# Patient Record
Sex: MALE | Race: WHITE | Employment: PART TIME | URBAN - METROPOLITAN AREA
[De-identification: names, ages, dates, MRNs, and addresses within clinical notes are randomized per-mention and may not be internally consistent; named-entity substitution may affect disease eponyms.]

---

## 2017-01-05 ENCOUNTER — TELEPHONE (OUTPATIENT)
Dept: FAMILY MEDICINE CLINIC | Facility: CLINIC | Age: 67
End: 2017-01-05

## 2017-01-05 DIAGNOSIS — E67.3 HYPERVITAMINOSIS D: ICD-10-CM

## 2017-01-05 DIAGNOSIS — Z86.39 H/O VITAMIN D DEFICIENCY: ICD-10-CM

## 2017-01-05 DIAGNOSIS — Z86.39 HX OF NON ANEMIC VITAMIN B12 DEFICIENCY: Primary | ICD-10-CM

## 2017-01-05 DIAGNOSIS — B18.2 HEPATITIS C CARRIER (HCC): ICD-10-CM

## 2017-01-05 DIAGNOSIS — I15.9 SECONDARY HYPERTENSION: ICD-10-CM

## 2017-01-05 DIAGNOSIS — Z12.5 PROSTATE CANCER SCREENING: ICD-10-CM

## 2017-01-05 DIAGNOSIS — E55.9 VITAMIN D DEFICIENCY: ICD-10-CM

## 2017-01-05 DIAGNOSIS — E78.5 HYPERLIPIDEMIA, UNSPECIFIED HYPERLIPIDEMIA TYPE: ICD-10-CM

## 2017-01-05 DIAGNOSIS — B18.2 CHRONIC HEPATITIS C WITHOUT HEPATIC COMA (HCC): Primary | ICD-10-CM

## 2017-01-05 NOTE — TELEPHONE ENCOUNTER
Please notify patient labs have been ordered, fast 8-10 hours prior to draw. Patient should complete 5-7 days prior to appt. Thank you!

## 2017-01-05 NOTE — TELEPHONE ENCOUNTER
Requesting labs prior to appt  LOV: 4/2016  RTC: 1 year   Last Labs: 5/2016    Future Appointments  Date Time Provider Jaron Dale   4/24/2017 4:30 PM Maryjo Childs MD [facility] [address] [facility] [address]       Okay to order routine labs at this time?  Hep C was negat

## 2017-01-05 NOTE — TELEPHONE ENCOUNTER
Patient said he has Chronic Hep C. And has not had a scan for a few years. He would like one ordered now so he can have prior to seeing you.   Future Appointments  Date Time Provider Jaron Dale   4/24/2017 4:30 PM Feli Haddad MD [facility] [address] [facility] [address]

## 2017-01-06 NOTE — TELEPHONE ENCOUNTER
Patient called and stated that Dr. Alyx Lomas ordered a kidney scan for him as well and wants to know if he should have the liver and kidney scan done together . He wants to know if this covers the same area.  He doesn't want to be charged extra through his insura

## 2017-01-06 NOTE — TELEPHONE ENCOUNTER
LM for patient that kidney u/s is different, okay to proceed with completing both. Can call back with any questions.

## 2017-01-12 NOTE — TELEPHONE ENCOUNTER
Spoke with Anahy Pringle and informed him that Dr. Nicolle Yuan ordered US specific to kidneys and Dr. Foster  ordered us complete abdomen which does cover the kidneys.    I advised patient to call Dr. Jorge Schroeder office and see what they recommend in regards to if patient should sti

## 2017-04-03 ENCOUNTER — LAB ENCOUNTER (OUTPATIENT)
Dept: LAB | Age: 67
End: 2017-04-03
Attending: INTERNAL MEDICINE
Payer: MEDICARE

## 2017-04-03 ENCOUNTER — HOSPITAL ENCOUNTER (OUTPATIENT)
Dept: ULTRASOUND IMAGING | Age: 67
Discharge: HOME | End: 2017-04-03
Attending: INTERNAL MEDICINE
Payer: MEDICARE

## 2017-04-03 ENCOUNTER — APPOINTMENT (OUTPATIENT)
Dept: ULTRASOUND IMAGING | Age: 67
End: 2017-04-03
Attending: UROLOGY
Payer: MEDICARE

## 2017-04-03 DIAGNOSIS — I15.9 SECONDARY HYPERTENSION: ICD-10-CM

## 2017-04-03 DIAGNOSIS — B18.2 CHRONIC HEPATITIS C WITHOUT HEPATIC COMA (HCC): ICD-10-CM

## 2017-04-03 DIAGNOSIS — Z12.5 PROSTATE CANCER SCREENING: ICD-10-CM

## 2017-04-03 DIAGNOSIS — E78.5 HYPERLIPIDEMIA, UNSPECIFIED HYPERLIPIDEMIA TYPE: ICD-10-CM

## 2017-04-03 DIAGNOSIS — E67.3 HYPERVITAMINOSIS D: ICD-10-CM

## 2017-04-03 DIAGNOSIS — B18.2 HEPATITIS C CARRIER (HCC): ICD-10-CM

## 2017-04-03 DIAGNOSIS — Z86.39 H/O VITAMIN D DEFICIENCY: ICD-10-CM

## 2017-04-03 DIAGNOSIS — Z86.39 HX OF NON ANEMIC VITAMIN B12 DEFICIENCY: ICD-10-CM

## 2017-04-03 DIAGNOSIS — E55.9 VITAMIN D DEFICIENCY: ICD-10-CM

## 2017-04-03 PROCEDURE — 80061 LIPID PANEL: CPT

## 2017-04-03 PROCEDURE — 76700 US EXAM ABDOM COMPLETE: CPT

## 2017-04-03 PROCEDURE — 82306 VITAMIN D 25 HYDROXY: CPT

## 2017-04-03 PROCEDURE — 36415 COLL VENOUS BLD VENIPUNCTURE: CPT

## 2017-04-03 PROCEDURE — 87522 HEPATITIS C REVRS TRNSCRPJ: CPT

## 2017-04-03 PROCEDURE — 82607 VITAMIN B-12: CPT

## 2017-04-03 PROCEDURE — 85025 COMPLETE CBC W/AUTO DIFF WBC: CPT

## 2017-04-03 PROCEDURE — 80053 COMPREHEN METABOLIC PANEL: CPT

## 2017-04-03 NOTE — PROGRESS NOTES
Quick Note:    He's got kidney cysts- he can fu with dr Jennifer Coon urologist  He has gallbladder thickening.  Liver normal  Can make a fu ov to discuss      ______

## 2017-04-04 NOTE — PROGRESS NOTES
Quick Note:    Labs here are normal, all are fine. Would not change patient's current regimen.  Please notify the patient.  ______

## 2017-04-24 ENCOUNTER — OFFICE VISIT (OUTPATIENT)
Dept: FAMILY MEDICINE CLINIC | Facility: CLINIC | Age: 67
End: 2017-04-24

## 2017-04-24 VITALS
BODY MASS INDEX: 26.98 KG/M2 | RESPIRATION RATE: 16 BRPM | HEART RATE: 60 BPM | WEIGHT: 178 LBS | HEIGHT: 68 IN | TEMPERATURE: 98 F | SYSTOLIC BLOOD PRESSURE: 118 MMHG | DIASTOLIC BLOOD PRESSURE: 76 MMHG

## 2017-04-24 DIAGNOSIS — E53.8 B12 DEFICIENCY: ICD-10-CM

## 2017-04-24 DIAGNOSIS — I72.3 ILIAC ARTERY ANEURYSM, BILATERAL (HCC): ICD-10-CM

## 2017-04-24 DIAGNOSIS — K82.4 POLYP OF GALLBLADDER: ICD-10-CM

## 2017-04-24 DIAGNOSIS — I48.20 CHRONIC ATRIAL FIBRILLATION (HCC): ICD-10-CM

## 2017-04-24 DIAGNOSIS — Z00.01 ENCOUNTER FOR GENERAL ADULT MEDICAL EXAMINATION WITH ABNORMAL FINDINGS: Primary | ICD-10-CM

## 2017-04-24 DIAGNOSIS — I10 ESSENTIAL HYPERTENSION: ICD-10-CM

## 2017-04-24 DIAGNOSIS — E78.2 MIXED HYPERLIPIDEMIA: ICD-10-CM

## 2017-04-24 DIAGNOSIS — N40.0 BENIGN NON-NODULAR PROSTATIC HYPERPLASIA WITHOUT LOWER URINARY TRACT SYMPTOMS: ICD-10-CM

## 2017-04-24 DIAGNOSIS — E55.9 VITAMIN D DEFICIENCY: ICD-10-CM

## 2017-04-24 PROCEDURE — G0439 PPPS, SUBSEQ VISIT: HCPCS | Performed by: INTERNAL MEDICINE

## 2017-04-24 RX ORDER — ZINC OXIDE 13 %
1 CREAM (GRAM) TOPICAL DAILY
COMMUNITY
End: 2017-05-01

## 2017-04-24 NOTE — PATIENT INSTRUCTIONS
Thank you for choosing Can Mcclure MD at [facility]  To Do: [address]  1. Gallbladder polyp- see dr Bobo Arroyo about it  2. Aneurysm of R thigh- see your cardiologist- show them test result to discuss  3.  Diet below    AVOID THESE FOODS TO Biscuits including chocolate chip biscuits   Bread, wheat – over 1 slice   Breadcrumbs   Cakes   Cereal bar, wheat based   Croissants   Crumpets   Egg noodles   Muffins   Pastries   Pasta, wheat over 1/2 cup cooked   Udon noodles   Wheat bran   Wheat cer Cheese, cream   Cheese, Halmoumi   Cheese, ricotta   Cream   Custard   Gelato   Ice cream   Kefir   Milk:   Cow milk   Goat milk   Evaporated milk   Sheep’s milk  Sour cream   Yoghurt – including greek yogurt    • Please signup for MY CHART, which is benjamin healthier and to improve your quality of life.     Referrals, and Radiology Information:    If your insurance requires a referral to a specialist, please allow 5 business days to process your referral request.    If Joyce Longo MD orders a CT or MRI, it ma

## 2017-04-24 NOTE — PROGRESS NOTES
Juwan Zayas is a 78year old male who presents for a Medicare Annual Wellness visit.     Health Maintenance Topics with due status: Overdue       Topic Date Due    Fall Risk Screening 04/20/2017    Annual Depression Screen 04/20/2017    Annual Physical 0 your current health state?: Good    How do you maintain positive mental well-being?: Social Interaction; Visiting Friends; Visiting Family    If you are a male age 38-65 or a female age 47-67, do you take aspirin?: Yes    Have you had any immunizations at an contraindicated based on patient's intolerance to aspirin. Diet assessment: good     Advanced Directive:  Healthcare Power of  on file in [address] [place] [address] Rd:   See above discussed with patient and provided information      Living Will on file in [facility]?    See a 13 DEBBIE IM    Update Immunization Activity if applicable    Hepatitis B No orders found for this or any previous visit. Update Immunization Activity if applicable    Tetanus No orders found for this or any previous visit.  Update Immunization Activity if yonathan INFORMATION:   Past Medical History   Diagnosis Date   • Acute hepatitis C without mention of hepatic coma 1/2006   • Sprain of lumbar region      Occassional low back pain   • Mitral valve disorders    • PUD (peptic ulcer disease) 11/1966   • Unspecified urgency, frequency and difficulty urinating. Musculoskeletal: Negative for arthralgias and no gait problem. Skin: Negative for color change and rash. Neurological: Negative for tremors, weakness and numbness. Hematological: Negative for adenopathy. Darrell Ulrich is a 78year old male who presents for a Medicare Assessment. PLAN SUMMARY:   Patient/Caregiver Education: Patient/Caregiver Education: There are no barriers to learning. Medical education done.  Outcome: Patient verbalizes understandin perfomed  Exercise counseling perfomed  5 year plan includes:  Fall Risk Screening due on 04/20/2017  Annual Depression Screen due on 04/20/2017  Annual Physical due on 04/20/2017  Adult Pneumonia Vaccine(2 of 2 - PPSV23) due on 04/20/2017  Influenza Vacci

## 2017-06-01 ENCOUNTER — MED REC SCAN ONLY (OUTPATIENT)
Dept: FAMILY MEDICINE CLINIC | Facility: CLINIC | Age: 67
End: 2017-06-01

## 2017-08-25 ENCOUNTER — OFFICE VISIT (OUTPATIENT)
Dept: FAMILY MEDICINE CLINIC | Facility: CLINIC | Age: 67
End: 2017-08-25

## 2017-08-25 VITALS
RESPIRATION RATE: 16 BRPM | HEART RATE: 72 BPM | BODY MASS INDEX: 27.28 KG/M2 | WEIGHT: 180 LBS | DIASTOLIC BLOOD PRESSURE: 80 MMHG | TEMPERATURE: 97 F | HEIGHT: 68 IN | SYSTOLIC BLOOD PRESSURE: 124 MMHG

## 2017-08-25 DIAGNOSIS — S16.1XXA STRAIN OF NECK MUSCLE, INITIAL ENCOUNTER: Primary | ICD-10-CM

## 2017-08-25 PROCEDURE — 99213 OFFICE O/P EST LOW 20 MIN: CPT | Performed by: NURSE PRACTITIONER

## 2017-08-25 RX ORDER — DICLOFENAC SODIUM 75 MG/1
75 TABLET, DELAYED RELEASE ORAL 2 TIMES DAILY
Qty: 60 TABLET | Refills: 0 | Status: SHIPPED | OUTPATIENT
Start: 2017-08-25 | End: 2018-04-24

## 2017-08-25 NOTE — PATIENT INSTRUCTIONS
Thank you for choosing AD[facility] Russell at [place] [address]  To Do: [address]  1. Schedule appt for physical therapy   2.  Can take anti-inflamm. (diclofeanc) as needed for pain (up to 2 times per day)  -massage, heat/ice, icy hot lotion or pa risks of treatment even beyond those discussed today.  All therapies have potential risk of harm or side effects or medication interactions.  It is your duty and for your safety to discuss with the pharmacist and our office with questions, and to notify us

## 2017-08-25 NOTE — PROGRESS NOTES
[facility] Internal Medicine Office Note  Chief Complaint:   Patient presents with:  Neck Pain: started in June, pain is located on the left side     HPI:   This is a 78year old male coming in for  HPI  C/o of left side of neck pain 2.5 months Weight as of this encounter: 180 lb. Vital signs reviewed. Appears stated age, well groomed. Physical Exam   Vitals reviewed. Constitutional: He is oriented to person, place, and time.  Vital signs are normal. He appears well-developed and well-nou Adult Pneumonia Vaccine(2 of 2 - PPSV23) due on 04/20/2017  Patient/Caregiver Education: There are no barriers to learning. Medical education done. Outcome: Patient verbalizes understanding.  Patient is notified to call with any questions, complications, al

## 2017-08-30 ENCOUNTER — OFFICE VISIT (OUTPATIENT)
Dept: PHYSICAL THERAPY | Age: 67
End: 2017-08-30
Attending: INTERNAL MEDICINE
Payer: MEDICARE

## 2017-08-30 DIAGNOSIS — S16.1XXA STRAIN OF NECK MUSCLE, INITIAL ENCOUNTER: ICD-10-CM

## 2017-08-30 PROCEDURE — 97161 PT EVAL LOW COMPLEX 20 MIN: CPT

## 2017-08-30 PROCEDURE — 97530 THERAPEUTIC ACTIVITIES: CPT

## 2017-08-30 NOTE — PROGRESS NOTES
SPINE EVALUATION:   Referring Physician: Ms. Darius Munroe  Diagnosis: Cervicogenic Headaches     Date of Service: 8/30/2017     PATIENT Anthony Ro is a 78year old y/o male who presents to therapy today with complaints of headaches that initiated Moderate restrictions of the middle and lower trapezius (3+/5). Flexibility: Moderate restrictions of the pec major/minor and the upper trapezius. Special tests: Decreased deep neck flexors MMT.       Today’s Treatment and Response:  Patient Ana Laura Meghan

## 2017-08-31 ENCOUNTER — TELEPHONE (OUTPATIENT)
Dept: FAMILY MEDICINE CLINIC | Facility: CLINIC | Age: 67
End: 2017-08-31

## 2017-08-31 NOTE — TELEPHONE ENCOUNTER
Patient wanted to know if he should take the diclofenac twice a day or as needed twice a day. Informed patient to take as needed twice a day. Patient voiced understanding.

## 2017-08-31 NOTE — TELEPHONE ENCOUNTER
Pt would like to clarify directions, sts when he picked up from pharmacy he was told differently then what is on his after visit summary. Diclofenac Sodium 75 MG Oral Tab EC 60 tablet 0 8/25/2017     Sig - Route:  Take 1 tablet (75 mg total) by mouth 2 (

## 2017-09-06 ENCOUNTER — OFFICE VISIT (OUTPATIENT)
Dept: PHYSICAL THERAPY | Age: 67
End: 2017-09-06
Attending: INTERNAL MEDICINE
Payer: MEDICARE

## 2017-09-06 PROCEDURE — 97110 THERAPEUTIC EXERCISES: CPT

## 2017-09-06 PROCEDURE — 97140 MANUAL THERAPY 1/> REGIONS: CPT

## 2017-09-06 NOTE — PROGRESS NOTES
Dx: Cervical OA         Authorized # of Visits:  12         Next MD visit: none scheduled  Fall Risk: standard         Precautions: n/a             Subjective: The patient states that the exercises are doing him well to this point.   Had some pain with an a

## 2017-09-11 ENCOUNTER — OFFICE VISIT (OUTPATIENT)
Dept: PHYSICAL THERAPY | Age: 67
End: 2017-09-11
Attending: INTERNAL MEDICINE
Payer: MEDICARE

## 2017-09-11 PROCEDURE — 97140 MANUAL THERAPY 1/> REGIONS: CPT

## 2017-09-11 PROCEDURE — 97110 THERAPEUTIC EXERCISES: CPT

## 2017-09-13 ENCOUNTER — OFFICE VISIT (OUTPATIENT)
Dept: PHYSICAL THERAPY | Age: 67
End: 2017-09-13
Attending: INTERNAL MEDICINE
Payer: MEDICARE

## 2017-09-13 PROCEDURE — 97110 THERAPEUTIC EXERCISES: CPT

## 2017-09-13 PROCEDURE — 97140 MANUAL THERAPY 1/> REGIONS: CPT

## 2017-09-13 NOTE — PROGRESS NOTES
Dx: Cervical OA         Authorized # of Visits:  12         Next MD visit: none scheduled  Fall Risk: standard         Precautions: n/a             Subjective: Neck ROM is increasing with less pain and increased postural awareness.   Had 2 day being painfre cervicothoracic region without increased pain. Upper thoracic spine remains moderately restricted, despite the patient making improvements with manipulation. Goals:   1. Increase FOTO >11% from INE.     2. Increase pec flexibility and posterior scap

## 2017-09-18 ENCOUNTER — OFFICE VISIT (OUTPATIENT)
Dept: PHYSICAL THERAPY | Age: 67
End: 2017-09-18
Attending: INTERNAL MEDICINE
Payer: MEDICARE

## 2017-09-18 PROCEDURE — 97110 THERAPEUTIC EXERCISES: CPT

## 2017-09-18 PROCEDURE — 97140 MANUAL THERAPY 1/> REGIONS: CPT

## 2017-09-18 NOTE — PROGRESS NOTES
Dx: Cervical OA         Authorized # of Visits:  12         Next MD visit: none scheduled  Fall Risk: standard         Precautions: n/a             Subjective: Feeling good. No new complaints. Objective: Treatment  proressed per treatment log.   Cerv Doorway nods x 10        Push up position Serratus Punch Push up position Serratus Punch        Push up position Unilateral trunk rotation. Push up position Unilateral trunk rotation. Skilled Services:  All exercises and manual intervention provi

## 2017-09-20 ENCOUNTER — OFFICE VISIT (OUTPATIENT)
Dept: PHYSICAL THERAPY | Age: 67
End: 2017-09-20
Attending: INTERNAL MEDICINE
Payer: MEDICARE

## 2017-09-20 PROCEDURE — 97140 MANUAL THERAPY 1/> REGIONS: CPT

## 2017-09-20 PROCEDURE — 97110 THERAPEUTIC EXERCISES: CPT

## 2017-09-20 NOTE — PROGRESS NOTES
Dx: Cervical OA         Authorized # of Visits:  12         Next MD visit: none scheduled  Fall Risk: standard         Precautions: n/a             Subjective: States he had mild mid thoracic pain after exercise last session.   States that the neck has impr posterior nods in doorway x 10 OA posterior nods in doorway x 10 OA posterior nods in doorway x 10 OA posterior nods in doorway x 10     Wall washes x 20 Wall washes x 20 Wall washes x 20 Wall washes x 20 Wall washes x 20       CC: Unilateral Rows with sca

## 2017-09-25 ENCOUNTER — OFFICE VISIT (OUTPATIENT)
Dept: PHYSICAL THERAPY | Age: 67
End: 2017-09-25
Attending: INTERNAL MEDICINE
Payer: MEDICARE

## 2017-09-25 PROCEDURE — 97110 THERAPEUTIC EXERCISES: CPT

## 2017-09-25 PROCEDURE — 97140 MANUAL THERAPY 1/> REGIONS: CPT

## 2017-09-25 NOTE — PROGRESS NOTES
Dx: Cervical OA         Authorized # of Visits:  12         Next MD visit: none scheduled  Fall Risk: standard         Precautions: n/a             Subjective: Feeling good. No complaints at this time.  States he had some soreness after PT last session, wh 30 sec x 3 pec stretch 30 sec x 3 pec stretch 30 sec x 3 pec stretch 30 sec x 3 pec stretch 30 sec x 3 pec stretch 30 sec x 3    OA posterior nods in doorway x 10 OA posterior nods in doorway x 10 OA posterior nods in doorway x 10 OA posterior nods in door

## 2017-09-27 ENCOUNTER — OFFICE VISIT (OUTPATIENT)
Dept: PHYSICAL THERAPY | Age: 67
End: 2017-09-27
Attending: INTERNAL MEDICINE
Payer: MEDICARE

## 2017-09-27 PROCEDURE — 97140 MANUAL THERAPY 1/> REGIONS: CPT

## 2017-09-27 PROCEDURE — 97110 THERAPEUTIC EXERCISES: CPT

## 2017-09-27 NOTE — PROGRESS NOTES
Dx: Cervical OA         Authorized # of Visits:  12         Next MD visit: none scheduled  Fall Risk: standard         Precautions: n/a             Subjective: Feeling good. No new complaints. Objective: Treatment  proressed per treatment log.   Coby Tello x 20 red \"w\" exercises x 20 red \"w\" exercises x 20 red \"w\" exercises x 20 red \"w\" exercises x 20 red   pec stretch 30 sec x 3 pec stretch 30 sec x 3 pec stretch 30 sec x 3 pec stretch 30 sec x 3 pec stretch 30 sec x 3 pec stretch 30 sec x 3 pec str posturing (MET). 3. Increase OA and AA motion to full active and passive motion without increase in headache type symptoms (MET). 4. Increase postural awareness to include sternal lift and posterior OA rotation (MET). Plan: DC PT.        Charge

## 2018-04-12 ENCOUNTER — TELEPHONE (OUTPATIENT)
Dept: FAMILY MEDICINE CLINIC | Facility: CLINIC | Age: 68
End: 2018-04-12

## 2018-04-12 ENCOUNTER — HOSPITAL ENCOUNTER (OUTPATIENT)
Dept: ULTRASOUND IMAGING | Age: 68
Discharge: HOME | End: 2018-04-12
Attending: FAMILY MEDICINE
Payer: MEDICARE

## 2018-04-12 ENCOUNTER — LAB ENCOUNTER (OUTPATIENT)
Dept: LAB | Age: 68
End: 2018-04-12
Attending: FAMILY MEDICINE
Payer: MEDICARE

## 2018-04-12 ENCOUNTER — HOSPITAL ENCOUNTER (OUTPATIENT)
Dept: ULTRASOUND IMAGING | Age: 68
Discharge: HOME | End: 2018-04-12
Attending: UROLOGY
Payer: MEDICARE

## 2018-04-12 DIAGNOSIS — E53.8 B12 DEFICIENCY: ICD-10-CM

## 2018-04-12 DIAGNOSIS — E78.2 MIXED HYPERLIPIDEMIA: ICD-10-CM

## 2018-04-12 DIAGNOSIS — D17.71 RENAL ANGIOMYOLIPOMA: ICD-10-CM

## 2018-04-12 DIAGNOSIS — I72.3 ILIAC ARTERY ANEURYSM, BILATERAL (HCC): ICD-10-CM

## 2018-04-12 DIAGNOSIS — N40.0 BENIGN NON-NODULAR PROSTATIC HYPERPLASIA WITHOUT LOWER URINARY TRACT SYMPTOMS: ICD-10-CM

## 2018-04-12 DIAGNOSIS — I10 ESSENTIAL HYPERTENSION: ICD-10-CM

## 2018-04-12 DIAGNOSIS — E55.9 VITAMIN D DEFICIENCY: ICD-10-CM

## 2018-04-12 DIAGNOSIS — B18.2 CHRONIC HEPATITIS C WITHOUT HEPATIC COMA (HCC): ICD-10-CM

## 2018-04-12 DIAGNOSIS — B18.2 CHRONIC HEPATITIS C WITHOUT HEPATIC COMA (HCC): Primary | ICD-10-CM

## 2018-04-12 PROCEDURE — 76700 US EXAM ABDOM COMPLETE: CPT | Performed by: FAMILY MEDICINE

## 2018-04-12 PROCEDURE — 85025 COMPLETE CBC W/AUTO DIFF WBC: CPT

## 2018-04-12 PROCEDURE — 81003 URINALYSIS AUTO W/O SCOPE: CPT

## 2018-04-12 PROCEDURE — 82306 VITAMIN D 25 HYDROXY: CPT

## 2018-04-12 PROCEDURE — 80053 COMPREHEN METABOLIC PANEL: CPT

## 2018-04-12 PROCEDURE — 82607 VITAMIN B-12: CPT

## 2018-04-12 PROCEDURE — 36415 COLL VENOUS BLD VENIPUNCTURE: CPT

## 2018-04-12 PROCEDURE — 80061 LIPID PANEL: CPT

## 2018-04-12 NOTE — TELEPHONE ENCOUNTER
Former patient of Dr. Janet Anne who has history of abd. Aortic aneurysm. He is to have it checked yearly and Dr. Autumn Pandey was to put in order. He is at outpatient now for u/s of kidneys and wants his abdominal u/s done today.   He is scheduled to f/u with Dr. Simin Shaw

## 2018-04-16 ENCOUNTER — TELEPHONE (OUTPATIENT)
Dept: FAMILY MEDICINE CLINIC | Facility: CLINIC | Age: 68
End: 2018-04-16

## 2018-04-24 ENCOUNTER — TELEPHONE (OUTPATIENT)
Dept: FAMILY MEDICINE CLINIC | Facility: CLINIC | Age: 68
End: 2018-04-24

## 2018-04-24 ENCOUNTER — OFFICE VISIT (OUTPATIENT)
Dept: FAMILY MEDICINE CLINIC | Facility: CLINIC | Age: 68
End: 2018-04-24

## 2018-04-24 VITALS
BODY MASS INDEX: 26.98 KG/M2 | HEIGHT: 68 IN | TEMPERATURE: 98 F | RESPIRATION RATE: 16 BRPM | DIASTOLIC BLOOD PRESSURE: 80 MMHG | HEART RATE: 62 BPM | OXYGEN SATURATION: 98 % | WEIGHT: 178 LBS | SYSTOLIC BLOOD PRESSURE: 110 MMHG

## 2018-04-24 DIAGNOSIS — Z00.00 ENCOUNTER FOR MEDICARE ANNUAL WELLNESS EXAM: Primary | ICD-10-CM

## 2018-04-24 DIAGNOSIS — B18.2 CHRONIC HEPATITIS C WITHOUT HEPATIC COMA (HCC): ICD-10-CM

## 2018-04-24 PROCEDURE — G0438 PPPS, INITIAL VISIT: HCPCS | Performed by: FAMILY MEDICINE

## 2018-04-24 RX ORDER — WARFARIN SODIUM 2 MG/1
TABLET ORAL
COMMUNITY
Start: 2018-04-06 | End: 2021-07-19 | Stop reason: ALTCHOICE

## 2018-04-24 NOTE — PATIENT INSTRUCTIONS
Thank you for choosing Fredy Matute MD at [place] [address]  To Do: [address]  1. Please see age appropriate health prevention below    [employer] is located in [address]. Monday, Tuesday & Friday – 8 a.m. to 4 p.m.   Wednesday, Thursd • Please call our office about any questions regarding your treatment/medicines/tests as a result of today's visit.  For your safety, read the entire package insert of all medicines prescribed to you and be aware of all of the risks of treatment even beyon Screening tests and vaccines are an important part of managing your health. Health counseling is essential, too. Below are guidelines for these, for men ages 72 and older. Talk with your healthcare provider to make sure you’re up-to-date on what you need. Prostate cancer All men in this age group, talk to healthcare provider about risks and benefits of digital rectal exam (OMAR) and prostate-specific antigen (PSA) screening1 At routine exams   Syphilis Men at increased risk for infection – talk with your hea Fall prevention (exercise, vitamin D supplements) All men in this age group At routine exams   Sexually transmitted infection Men at increased risk for infection – talk with your healthcare provider At routine exams   Use of daily aspirin Men ages 39 to 78

## 2018-04-24 NOTE — TELEPHONE ENCOUNTER
Per today's OV, here is the fax # to the cardiologist as discussed in [address] Daniel Notice Dr. Lupe Forman

## 2018-04-24 NOTE — TELEPHONE ENCOUNTER
Recent labs and ultrasound were faxed to Dr Irasema Sequeira @ [phone]. # [phone].   [facility] [address]  [address] [address]

## 2018-04-24 NOTE — H&P
Wellness Exam    REASON FOR VISIT:    Fernando Connelly is a 80year old male who presents for an [address]     Current Complaints: Mr. Alan Delgado is a pleasant 79 y/o M here for his wellness  Flu Shot: see immunization record  Health Maintenance T No results found for: FOB, OCCULTSTOOL    Obesity Screening Screen all adults annually Body mass index is 27.06 kg/m².       Preventive Services for Which Recommendations Vary with Risk Recommendation Internal Lab or Procedure External Lab or Procedure   Ch aneurysm, bilateral ([facility] [address] 5/9/2016   • Mitral valve disorders(424.0)    • Mitral valve insufficiency 1/9/2014    severe    • Pacemaker 7/28/2015   • PUD (peptic ulcer disease) 11/1966   • Renal cyst 5/9/2016   • S/P mitral valve repair 8/4/2014 2005 rin problem. Skin: Negative for color change and rash. Neurological: Negative for tremors, weakness and numbness. Hematological: Negative for adenopathy. Does not bruise/bleed easily. Psychiatric/Behavioral: Negative for confusion and agitation.  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

## 2018-06-29 ENCOUNTER — APPOINTMENT (OUTPATIENT)
Dept: LAB | Age: 68
End: 2018-06-29
Attending: FAMILY MEDICINE
Payer: MEDICARE

## 2018-06-29 DIAGNOSIS — E55.9 VITAMIN D DEFICIENCY: ICD-10-CM

## 2018-06-29 DIAGNOSIS — B18.2 CHRONIC HEPATITIS C WITHOUT HEPATIC COMA (HCC): ICD-10-CM

## 2018-06-29 LAB — 25-HYDROXYVITAMIN D (TOTAL): 31.4 NG/ML (ref 30–100)

## 2018-06-29 PROCEDURE — 87522 HEPATITIS C REVRS TRNSCRPJ: CPT

## 2018-06-29 PROCEDURE — 82306 VITAMIN D 25 HYDROXY: CPT

## 2018-06-29 PROCEDURE — 36415 COLL VENOUS BLD VENIPUNCTURE: CPT

## 2018-07-01 ENCOUNTER — MED REC SCAN ONLY (OUTPATIENT)
Dept: FAMILY MEDICINE CLINIC | Facility: CLINIC | Age: 68
End: 2018-07-01

## 2018-07-05 ENCOUNTER — TELEPHONE (OUTPATIENT)
Dept: FAMILY MEDICINE CLINIC | Facility: CLINIC | Age: 68
End: 2018-07-05

## 2018-07-05 LAB — HCV RNA SERPL QL NAA+PROBE: NOT DETECTED

## 2018-07-05 NOTE — TELEPHONE ENCOUNTER
Patient called for results from bloodwork done on 06-28-18. Melissa Fraser it is ok to call tomorrow.

## 2019-02-09 ENCOUNTER — HOSPITAL ENCOUNTER (EMERGENCY)
Age: 69
Discharge: HOME | End: 2019-02-09
Attending: EMERGENCY MEDICINE
Payer: MEDICARE

## 2019-02-09 VITALS
OXYGEN SATURATION: 96 % | RESPIRATION RATE: 16 BRPM | HEIGHT: 69 IN | TEMPERATURE: 99 F | DIASTOLIC BLOOD PRESSURE: 80 MMHG | SYSTOLIC BLOOD PRESSURE: 142 MMHG | BODY MASS INDEX: 26.66 KG/M2 | HEART RATE: 60 BPM | WEIGHT: 180 LBS

## 2019-02-09 DIAGNOSIS — R11.2 NAUSEA AND VOMITING IN ADULT: Primary | ICD-10-CM

## 2019-02-09 LAB
ALBUMIN SERPL-MCNC: 3.5 G/DL (ref 3.1–4.5)
ALBUMIN/GLOB SERPL: 0.8 {RATIO} (ref 1–2)
ALP LIVER SERPL-CCNC: 53 U/L (ref 45–117)
ALT SERPL-CCNC: 21 U/L (ref 17–63)
ANION GAP SERPL CALC-SCNC: 6 MMOL/L (ref 0–18)
APTT PPP: 26 SECONDS (ref 26.1–34.6)
AST SERPL-CCNC: 25 U/L (ref 15–41)
BASOPHILS # BLD AUTO: 0.02 X10(3) UL (ref 0–0.2)
BASOPHILS NFR BLD AUTO: 0.2 %
BILIRUB SERPL-MCNC: 1.3 MG/DL (ref 0.1–2)
BUN BLD-MCNC: 21 MG/DL (ref 8–20)
BUN/CREAT SERPL: 23.1 (ref 10–20)
CALCIUM BLD-MCNC: 8.6 MG/DL (ref 8.3–10.3)
CHLORIDE SERPL-SCNC: 102 MMOL/L (ref 101–111)
CO2 SERPL-SCNC: 29 MMOL/L (ref 22–32)
COLOR UR AUTO: YELLOW
CREAT BLD-MCNC: 0.91 MG/DL (ref 0.7–1.3)
DEPRECATED RDW RBC AUTO: 45.3 FL (ref 35.1–46.3)
EOSINOPHIL # BLD AUTO: 0.01 X10(3) UL (ref 0–0.7)
EOSINOPHIL NFR BLD AUTO: 0.1 %
ERYTHROCYTE [DISTWIDTH] IN BLOOD BY AUTOMATED COUNT: 13 % (ref 11–15)
GLOBULIN PLAS-MCNC: 4.2 G/DL (ref 2.8–4.4)
GLUCOSE BLD-MCNC: 113 MG/DL (ref 70–99)
GLUCOSE UR STRIP.AUTO-MCNC: NEGATIVE MG/DL
HCT VFR BLD AUTO: 42.2 % (ref 39–53)
HGB BLD-MCNC: 13.3 G/DL (ref 13–17.5)
IMM GRANULOCYTES # BLD AUTO: 0.03 X10(3) UL (ref 0–1)
IMM GRANULOCYTES NFR BLD: 0.3 %
INR BLD: 2.57 (ref 0.92–1.08)
KETONES UR STRIP.AUTO-MCNC: 15 MG/DL
LEUKOCYTE ESTERASE UR QL STRIP.AUTO: NEGATIVE
LIPASE: 70 U/L (ref 73–393)
LYMPHOCYTES # BLD AUTO: 0.72 X10(3) UL (ref 1–4)
LYMPHOCYTES NFR BLD AUTO: 6.9 %
M PROTEIN MFR SERPL ELPH: 7.7 G/DL (ref 6.4–8.2)
MCH RBC QN AUTO: 29.8 PG (ref 26–34)
MCHC RBC AUTO-ENTMCNC: 31.5 G/DL (ref 31–37)
MCV RBC AUTO: 94.6 FL (ref 80–100)
MONOCYTES # BLD AUTO: 1.04 X10(3) UL (ref 0.1–1)
MONOCYTES NFR BLD AUTO: 9.9 %
NEUTROPHILS # BLD AUTO: 8.68 X10 (3) UL (ref 1.5–7.7)
NEUTROPHILS # BLD AUTO: 8.68 X10(3) UL (ref 1.5–7.7)
NEUTROPHILS NFR BLD AUTO: 82.6 %
NITRITE UR QL STRIP.AUTO: NEGATIVE
OSMOLALITY SERPL CALC.SUM OF ELEC: 288 MOSM/KG (ref 275–295)
PH UR STRIP.AUTO: 6 [PH] (ref 4.5–8)
PLATELET # BLD AUTO: 197 10(3)UL (ref 150–450)
POTASSIUM SERPL-SCNC: 4.2 MMOL/L (ref 3.6–5.1)
PSA SERPL DL<=0.01 NG/ML-MCNC: 27.8 SECONDS (ref 12.5–14.1)
RBC # BLD AUTO: 4.46 X10(6)UL (ref 3.8–5.8)
SODIUM SERPL-SCNC: 137 MMOL/L (ref 136–144)
SP GR UR STRIP.AUTO: 1.02 (ref 1–1.03)
TROPONIN I SERPL-MCNC: <0.046 NG/ML (ref ?–0.05)
UROBILINOGEN UR STRIP.AUTO-MCNC: 1 MG/DL
WBC # BLD AUTO: 10.5 X10(3) UL (ref 4–11)

## 2019-02-09 PROCEDURE — 93005 ELECTROCARDIOGRAM TRACING: CPT

## 2019-02-09 PROCEDURE — 85025 COMPLETE CBC W/AUTO DIFF WBC: CPT | Performed by: EMERGENCY MEDICINE

## 2019-02-09 PROCEDURE — 99285 EMERGENCY DEPT VISIT HI MDM: CPT

## 2019-02-09 PROCEDURE — 96360 HYDRATION IV INFUSION INIT: CPT

## 2019-02-09 PROCEDURE — 85730 THROMBOPLASTIN TIME PARTIAL: CPT | Performed by: EMERGENCY MEDICINE

## 2019-02-09 PROCEDURE — 85610 PROTHROMBIN TIME: CPT | Performed by: EMERGENCY MEDICINE

## 2019-02-09 PROCEDURE — 80053 COMPREHEN METABOLIC PANEL: CPT | Performed by: EMERGENCY MEDICINE

## 2019-02-09 PROCEDURE — 84484 ASSAY OF TROPONIN QUANT: CPT | Performed by: EMERGENCY MEDICINE

## 2019-02-09 PROCEDURE — 96361 HYDRATE IV INFUSION ADD-ON: CPT

## 2019-02-09 PROCEDURE — 83690 ASSAY OF LIPASE: CPT | Performed by: EMERGENCY MEDICINE

## 2019-02-09 PROCEDURE — 81001 URINALYSIS AUTO W/SCOPE: CPT | Performed by: EMERGENCY MEDICINE

## 2019-02-09 PROCEDURE — 99284 EMERGENCY DEPT VISIT MOD MDM: CPT

## 2019-02-09 PROCEDURE — 93010 ELECTROCARDIOGRAM REPORT: CPT

## 2019-02-09 RX ORDER — SODIUM CHLORIDE 9 MG/ML
125 INJECTION, SOLUTION INTRAVENOUS CONTINUOUS
Status: DISCONTINUED | OUTPATIENT
Start: 2019-02-09 | End: 2019-02-09

## 2019-02-09 RX ORDER — ONDANSETRON 4 MG/1
4 TABLET, ORALLY DISINTEGRATING ORAL EVERY 6 HOURS PRN
Qty: 10 TABLET | Refills: 0 | Status: SHIPPED | OUTPATIENT
Start: 2019-02-09 | End: 2019-02-16

## 2019-02-09 RX ORDER — ONDANSETRON 2 MG/ML
4 INJECTION INTRAMUSCULAR; INTRAVENOUS ONCE
Status: DISCONTINUED | OUTPATIENT
Start: 2019-02-09 | End: 2019-02-09

## 2019-02-09 NOTE — ED INITIAL ASSESSMENT (HPI)
Pt states Thursday night vomited, once again Friday am and last night ,states thurs night had epigastric pain  Which resolved,.  Denies nausea now

## 2019-02-09 NOTE — ED PROVIDER NOTES
Patient Seen in: THE [facility] Emergency Department In [place]    History   Patient presents with:  Nausea/Vomiting/Diarrhea (gastrointestinal)    Stated Complaint: vomiting since Wednesday    HPI    Patient is a 50-year-old male with a history of atrial fib when he was a teenager.    • OTHER SURGICAL HISTORY  7/14/2005    mitral valve repair by Dr. Jose Manuel Ramirez   • REPLACEMENT OF MITRAL VALVE  6.2015    bioprosthesis bioprost mitral valve 6.2015- promise- have junctional and afib after pacer placed- rec Value    Glucose 113 (*)     BUN 21 (*)     BUN/CREA Ratio 23.1 (*)     A/G Ratio 0.8 (*)     All other components within normal limits   PROTHROMBIN TIME (PT) - Abnormal; Notable for the following components:    PT 27.8 (*)     INR 2.57 (*)     All other rhythm at 60. Incomplete right bundle branch block with RVH. No ST segment or T wave changes. MDM   50-year-old male presenting with intermittent nausea and vomiting for the last 2 days.   He had some epigastric pain on the day of onset b

## 2019-02-11 LAB
ATRIAL RATE: 60 BPM
P-R INTERVAL: 282 MS
Q-T INTERVAL: 416 MS
QRS DURATION: 108 MS
QTC CALCULATION (BEZET): 416 MS
R AXIS: 244 DEGREES
T AXIS: 17 DEGREES
VENTRICULAR RATE: 60 BPM

## 2019-03-19 ENCOUNTER — TELEPHONE (OUTPATIENT)
Dept: FAMILY MEDICINE CLINIC | Facility: CLINIC | Age: 69
End: 2019-03-19

## 2019-03-19 DIAGNOSIS — I10 HYPERTENSION, UNSPECIFIED TYPE: ICD-10-CM

## 2019-03-19 DIAGNOSIS — Z12.5 SCREENING FOR MALIGNANT NEOPLASM OF PROSTATE: ICD-10-CM

## 2019-03-19 DIAGNOSIS — E55.9 VITAMIN D DEFICIENCY: ICD-10-CM

## 2019-03-19 DIAGNOSIS — E53.8 VITAMIN B12 DEFICIENCY: ICD-10-CM

## 2019-03-19 DIAGNOSIS — Z00.00 ROUTINE GENERAL MEDICAL EXAMINATION AT A HEALTH CARE FACILITY: ICD-10-CM

## 2019-03-19 DIAGNOSIS — B18.2 CHRONIC HEPATITIS C WITHOUT HEPATIC COMA (HCC): ICD-10-CM

## 2019-03-19 DIAGNOSIS — B18.2 HEPATITIS C CARRIER (HCC): ICD-10-CM

## 2019-03-19 DIAGNOSIS — E78.5 HYPERLIPIDEMIA, UNSPECIFIED HYPERLIPIDEMIA TYPE: ICD-10-CM

## 2019-03-19 DIAGNOSIS — I72.3 ILIAC ARTERY ANEURYSM, BILATERAL (HCC): Primary | ICD-10-CM

## 2019-03-19 NOTE — TELEPHONE ENCOUNTER
Pt has an upcoming appt and needs an order for US of the abd for aneurysm and needs his yearly US. Also needs labs placed for hepatitis and what ever else the physician wants.     Future Appointments   Date Time Provider Jaron Dale   4/24/2019  8:00

## 2019-04-15 ENCOUNTER — HOSPITAL ENCOUNTER (OUTPATIENT)
Dept: ULTRASOUND IMAGING | Age: 69
Discharge: HOME | End: 2019-04-15
Attending: FAMILY MEDICINE
Payer: MEDICARE

## 2019-04-15 ENCOUNTER — LAB ENCOUNTER (OUTPATIENT)
Dept: LAB | Age: 69
End: 2019-04-15
Attending: FAMILY MEDICINE
Payer: MEDICARE

## 2019-04-15 DIAGNOSIS — I72.3 ILIAC ARTERY ANEURYSM, BILATERAL (HCC): ICD-10-CM

## 2019-04-15 DIAGNOSIS — E78.5 HYPERLIPIDEMIA, UNSPECIFIED HYPERLIPIDEMIA TYPE: ICD-10-CM

## 2019-04-15 DIAGNOSIS — I10 HYPERTENSION, UNSPECIFIED TYPE: ICD-10-CM

## 2019-04-15 DIAGNOSIS — B18.2 CHRONIC HEPATITIS C WITHOUT HEPATIC COMA (HCC): ICD-10-CM

## 2019-04-15 DIAGNOSIS — B18.2 HEPATITIS C CARRIER (HCC): ICD-10-CM

## 2019-04-15 DIAGNOSIS — E53.8 VITAMIN B12 DEFICIENCY: ICD-10-CM

## 2019-04-15 DIAGNOSIS — Z12.5 SCREENING FOR MALIGNANT NEOPLASM OF PROSTATE: ICD-10-CM

## 2019-04-15 DIAGNOSIS — E55.9 VITAMIN D DEFICIENCY: ICD-10-CM

## 2019-04-15 DIAGNOSIS — Z00.00 ROUTINE GENERAL MEDICAL EXAMINATION AT A HEALTH CARE FACILITY: ICD-10-CM

## 2019-04-15 PROCEDURE — 85025 COMPLETE CBC W/AUTO DIFF WBC: CPT

## 2019-04-15 PROCEDURE — 82607 VITAMIN B-12: CPT

## 2019-04-15 PROCEDURE — 76700 US EXAM ABDOM COMPLETE: CPT | Performed by: FAMILY MEDICINE

## 2019-04-15 PROCEDURE — 87522 HEPATITIS C REVRS TRNSCRPJ: CPT

## 2019-04-15 PROCEDURE — 80053 COMPREHEN METABOLIC PANEL: CPT

## 2019-04-15 PROCEDURE — 36415 COLL VENOUS BLD VENIPUNCTURE: CPT

## 2019-04-15 PROCEDURE — 84443 ASSAY THYROID STIM HORMONE: CPT

## 2019-04-15 PROCEDURE — 80061 LIPID PANEL: CPT

## 2019-04-15 PROCEDURE — 82306 VITAMIN D 25 HYDROXY: CPT

## 2019-04-24 ENCOUNTER — OFFICE VISIT (OUTPATIENT)
Dept: FAMILY MEDICINE CLINIC | Facility: CLINIC | Age: 69
End: 2019-04-24
Payer: MEDICARE

## 2019-04-24 VITALS
SYSTOLIC BLOOD PRESSURE: 112 MMHG | DIASTOLIC BLOOD PRESSURE: 70 MMHG | HEART RATE: 68 BPM | RESPIRATION RATE: 16 BRPM | TEMPERATURE: 99 F | BODY MASS INDEX: 26.83 KG/M2 | HEIGHT: 68 IN | WEIGHT: 177 LBS

## 2019-04-24 DIAGNOSIS — Z13.0 SCREENING FOR ENDOCRINE, METABOLIC AND IMMUNITY DISORDER: ICD-10-CM

## 2019-04-24 DIAGNOSIS — B18.2 CHRONIC HEPATITIS C WITHOUT HEPATIC COMA (HCC): ICD-10-CM

## 2019-04-24 DIAGNOSIS — Z00.00 ENCOUNTER FOR MEDICARE ANNUAL WELLNESS EXAM: Primary | ICD-10-CM

## 2019-04-24 DIAGNOSIS — E55.9 VITAMIN D DEFICIENCY: ICD-10-CM

## 2019-04-24 DIAGNOSIS — Z13.228 SCREENING FOR ENDOCRINE, METABOLIC AND IMMUNITY DISORDER: ICD-10-CM

## 2019-04-24 DIAGNOSIS — Z12.5 SCREENING FOR MALIGNANT NEOPLASM OF PROSTATE: ICD-10-CM

## 2019-04-24 DIAGNOSIS — Z13.29 SCREENING FOR ENDOCRINE, METABOLIC AND IMMUNITY DISORDER: ICD-10-CM

## 2019-04-24 PROCEDURE — G0439 PPPS, SUBSEQ VISIT: HCPCS | Performed by: FAMILY MEDICINE

## 2019-04-24 NOTE — PATIENT INSTRUCTIONS
Thank you for choosing Willie Gomez MD at [place] [address]  To Do: [address]  1. Please see age appropriate health prevention below    [employer] is located in [address]. Monday, Tuesday & Friday – 8 a.m. to 4 p.m.   Wednesday, Thursd the benefits outweigh those potential risks and we strive to make you healthier and to improve your quality of life.     Referrals, and Radiology Information:    If your insurance requires a referral to a specialist, please allow 5 business days to process have ever smoked 1 ultrasound   Alcohol misuse All men in this age group At routine exams   Blood pressure All men in this age group Yearly checkup if your blood pressure is normal  Normal blood pressure is less than 120/80 mm Hg  If your blood pressure re talk with your healthcare provider Ask your healthcare provider   Vision All men in this age group Every 1 to 2 years; if you have a chronic health condition, ask your healthcare provider if you needs exams more often   Vaccine Who needs it How often   Chi effects it can cause All men in this age group Every visit   [address] [place] [address] [facility] Network   Date Last Reviewed: 2/1/2017  © 9539-1715 [employer]. [address] [facility], [address] [place] [address]. All rights reserved.  This information is n Medicare, and non-screening if indicated for medical reasons    Electrocardiogram date02/09/2019 Routine EKG is not a screening covered service except at the Welcome to Medicare Visit    Abdominal aortic aneurysm screening (once between ages 73-68)  No res your 65th birthday    Hepatitis B for Moderate/High Risk No orders found for this or any previous visit.  Medium/high risk factors:   End-stage renal disease   Hemophiliacs who received Factor VIII or IX concentrates   Clients of institutions for the mental

## 2019-04-24 NOTE — PROGRESS NOTES
HPI:   Maxx Echeverria is a 77year old male who presents for a Medicare Subsequent Annual Wellness visit (Pt already had Initial Annual Wellness).     Mr. Laura Mcclure is a pleasant [id number] male with known history of atrial fibrillation on anticoagulation wit Epic.   Not Discussed         He has never smoked tobacco.    CAGE Alcohol screening   Constantino Shukla was screened for Alcohol abuse and had a score of 0 so is at low risk.      Patient Care Team: Patient Care Team:  Gurinder Barth MD as PCP - General  To without mention of hepatic coma(070.51) (1/2006), Bladder wall thickening (5/9/2016), Flatulence/gas pain/belching, Hip arthritis (5/9/2016), History of cardiac murmur, Iliac artery aneurysm, bilateral ([facility] [address] (5/9/2016), Mitral valve disorders(424.0), Prosper People following:    Height as of 2/9/19: 69\". Weight as of 2/9/19: 180 lb.     Medicare Hearing Assessment  (Required for AWV/SWV)    Whispered Voice        Visual Acuity                           Physical Exam    Constitutional: He appears well-developed, no provided for quick review of chart, separate sheet to patient  [address] Internal Lab or Procedure External Lab or Procedure   Diabetes Screening      HbgA1C   Annually No results found for: A1C    No flowsheet data fo drug abusers     Tetanus Toxoid  Only covered with a cut with metal- TD and TDaP Not covered by Medicare Part B) No vaccine history found This may be covered with your prescription benefits, but Medicare does not cover unless Medically needed    Zoster   N

## 2020-05-26 ENCOUNTER — TELEPHONE (OUTPATIENT)
Dept: FAMILY MEDICINE CLINIC | Facility: CLINIC | Age: 70
End: 2020-05-26

## 2020-05-26 DIAGNOSIS — E55.9 VITAMIN D DEFICIENCY: ICD-10-CM

## 2020-05-26 DIAGNOSIS — E53.8 VITAMIN B12 DEFICIENCY: ICD-10-CM

## 2020-05-26 DIAGNOSIS — Z00.00 LABORATORY EXAMINATION ORDERED AS PART OF A ROUTINE GENERAL MEDICAL EXAMINATION: Primary | ICD-10-CM

## 2020-05-26 DIAGNOSIS — B18.2 CHRONIC HEPATITIS C WITHOUT HEPATIC COMA (HCC): ICD-10-CM

## 2020-05-26 DIAGNOSIS — Z12.5 SCREENING FOR MALIGNANT NEOPLASM OF PROSTATE: ICD-10-CM

## 2020-05-26 NOTE — TELEPHONE ENCOUNTER
Pt tried to schedule an appt for his labs. They are all . He would like new orders placed in the system.

## 2020-05-26 NOTE — TELEPHONE ENCOUNTER
Labs reordered. Spoke with pt, advised new lab orders placed. He will call to schedule labs and call office back to reschedule annual visit. Task completed.

## 2020-05-28 ENCOUNTER — LAB ENCOUNTER (OUTPATIENT)
Dept: LAB | Age: 70
End: 2020-05-28
Attending: FAMILY MEDICINE
Payer: MEDICARE

## 2020-05-28 DIAGNOSIS — B18.2 CHRONIC HEPATITIS C WITHOUT HEPATIC COMA (HCC): ICD-10-CM

## 2020-05-28 DIAGNOSIS — Z00.00 LABORATORY EXAMINATION ORDERED AS PART OF A ROUTINE GENERAL MEDICAL EXAMINATION: ICD-10-CM

## 2020-05-28 DIAGNOSIS — E53.8 VITAMIN B12 DEFICIENCY: ICD-10-CM

## 2020-05-28 DIAGNOSIS — Z12.5 SCREENING FOR MALIGNANT NEOPLASM OF PROSTATE: ICD-10-CM

## 2020-05-28 DIAGNOSIS — E55.9 VITAMIN D DEFICIENCY: ICD-10-CM

## 2020-05-28 PROCEDURE — 80053 COMPREHEN METABOLIC PANEL: CPT

## 2020-05-28 PROCEDURE — 82306 VITAMIN D 25 HYDROXY: CPT

## 2020-05-28 PROCEDURE — 82607 VITAMIN B-12: CPT

## 2020-05-28 PROCEDURE — 80061 LIPID PANEL: CPT

## 2020-05-28 PROCEDURE — 87522 HEPATITIS C REVRS TRNSCRPJ: CPT

## 2020-05-28 PROCEDURE — 36415 COLL VENOUS BLD VENIPUNCTURE: CPT

## 2020-05-28 PROCEDURE — 84443 ASSAY THYROID STIM HORMONE: CPT

## 2020-05-28 PROCEDURE — 85025 COMPLETE CBC W/AUTO DIFF WBC: CPT

## 2020-05-29 ENCOUNTER — HOSPITAL ENCOUNTER (OUTPATIENT)
Dept: GENERAL RADIOLOGY | Age: 70
Discharge: HOME | End: 2020-05-29
Attending: FAMILY MEDICINE
Payer: MEDICARE

## 2020-05-29 ENCOUNTER — VIRTUAL PHONE E/M (OUTPATIENT)
Dept: FAMILY MEDICINE CLINIC | Facility: CLINIC | Age: 70
End: 2020-05-29
Payer: MEDICARE

## 2020-05-29 DIAGNOSIS — M25.572 ACUTE LEFT ANKLE PAIN: ICD-10-CM

## 2020-05-29 DIAGNOSIS — M25.572 ACUTE LEFT ANKLE PAIN: Primary | ICD-10-CM

## 2020-05-29 PROCEDURE — 99441 PHONE E/M BY PHYS 5-10 MIN: CPT | Performed by: FAMILY MEDICINE

## 2020-05-29 PROCEDURE — 73610 X-RAY EXAM OF ANKLE: CPT | Performed by: FAMILY MEDICINE

## 2020-05-29 NOTE — PATIENT INSTRUCTIONS
Thank you for choosing Shahrzad St MD at [place] [address]  To Do: [address]  1. Please get Voltaren gel over-the-counter from the pharmacy and applied to affected area of the left ankle where it hurts.   Continue with heat and wearing ankle galvan It is your duty and for your safety to discuss with the pharmacist and our office with questions, and to notify us and stop treatment if problems arise, but know that our intention is that the benefits outweigh those potential risks and we strive to make y

## 2020-05-29 NOTE — PROGRESS NOTES
Virtual Telephone Check-In    Jane Dtuta verbally consents to a Virtual/Telephone Check-In visit on 05/29/20. Patient has been referred to the [facility] website at [email or web address] to review the yearly Consent to Treat document.     Patient Rhodes aDrrel

## 2020-05-29 NOTE — PROGRESS NOTES
HPI:    Patient ID: Constantino Shukla is a 72year old male.     HPI  Mr. Davon Wagoner is a pleasant [id number]-[id number] male with known history of atrial fibrillation on anticoagulation with Coumadin, status post mitral valve replacement, dyslipidemia, HCV, vitamin D defic results; we shall give him more recommendations thereafter. He verbalized understanding and agreement of plan of care  No orders of the defined types were placed in this encounter.       Meds This Visit:  Requested Prescriptions      No prescriptions reque

## 2020-07-14 ENCOUNTER — OFFICE VISIT (OUTPATIENT)
Dept: FAMILY MEDICINE CLINIC | Facility: CLINIC | Age: 70
End: 2020-07-14
Payer: MEDICARE

## 2020-07-14 VITALS
HEART RATE: 62 BPM | WEIGHT: 179 LBS | SYSTOLIC BLOOD PRESSURE: 112 MMHG | TEMPERATURE: 98 F | HEIGHT: 68 IN | DIASTOLIC BLOOD PRESSURE: 78 MMHG | BODY MASS INDEX: 27.13 KG/M2 | RESPIRATION RATE: 16 BRPM

## 2020-07-14 DIAGNOSIS — N28.1 KIDNEY CYSTS: ICD-10-CM

## 2020-07-14 DIAGNOSIS — Z00.00 ENCOUNTER FOR MEDICARE ANNUAL WELLNESS EXAM: Primary | ICD-10-CM

## 2020-07-14 DIAGNOSIS — Z13.6 SCREENING FOR AAA (ABDOMINAL AORTIC ANEURYSM): ICD-10-CM

## 2020-07-14 DIAGNOSIS — Z12.5 SCREENING FOR MALIGNANT NEOPLASM OF PROSTATE: ICD-10-CM

## 2020-07-14 DIAGNOSIS — Z00.00 ENCOUNTER FOR ANNUAL HEALTH EXAMINATION: ICD-10-CM

## 2020-07-14 DIAGNOSIS — E55.9 VITAMIN D DEFICIENCY: ICD-10-CM

## 2020-07-14 DIAGNOSIS — I72.3 ILIAC ARTERY ANEURYSM, BILATERAL (HCC): ICD-10-CM

## 2020-07-14 DIAGNOSIS — I48.91 ATRIAL FIBRILLATION, UNSPECIFIED TYPE (HCC): ICD-10-CM

## 2020-07-14 DIAGNOSIS — B18.2 CHRONIC HEPATITIS C WITHOUT HEPATIC COMA (HCC): ICD-10-CM

## 2020-07-14 DIAGNOSIS — E53.8 VITAMIN B12 DEFICIENCY: ICD-10-CM

## 2020-07-14 PROCEDURE — G0439 PPPS, SUBSEQ VISIT: HCPCS | Performed by: FAMILY MEDICINE

## 2020-07-14 NOTE — PATIENT INSTRUCTIONS
Thank you for choosing Coty Reyes MD at [place] [address]  To Do: [address]  1. Please see age appropriate health prevention below    [email or web address] is located in [address]. Monday, Tuesday & Friday – 8 a.m. to 4 p.m.   Wednesday, Thursd the benefits outweigh those potential risks and we strive to make you healthier and to improve your quality of life.     Referrals, and Radiology Information:    If your insurance requires a referral to a specialist, please allow 5 business days to process have ever smoked 1 ultrasound   Alcohol misuse All men in this age group At routine exams   Blood pressure All men in this age group Yearly checkup if your blood pressure is normal  Normal blood pressure is less than 120/80 mm Hg  If your blood pressure re talk with your healthcare provider Ask your healthcare provider   Vision All men in this age group Every 1 to 2 years; if you have a chronic health condition, ask your healthcare provider if you needs exams more often   Vaccine Who needs it How often   Chi effects it can cause All men in this age group Every visit   [address] [place] [address] [facility]   Bryanna last reviewed this educational content on 2/1/2017  © 5762-0093 [employer]. [address]7 [facility], [address] [place], [place] [address].  All rights if needed at [facility], and non-screening if indicated for medical reasons    Electrocardiogram date02/09/2019 Routine EKG is not a screening covered service except at the Welcome to Medicare Visit    Abdominal aortic aneurysm screening (once bet visit. Please get once after your 65th birthday    Hepatitis B for Moderate/High Risk No orders found for this or any previous visit.  Medium/high risk factors:   End-stage renal disease   Hemophiliacs who received Factor VIII or IX concentrates   Clients o

## 2020-07-14 NOTE — PROGRESS NOTES
HPI:   Olinda Ye is a 72year old male who presents for a Medicare Subsequent Annual Wellness visit (Pt already had Initial Annual Wellness).   Mr. Shadi Morrison is a pleasant 51-QEZO-PQA male with known history of atrial fibrillation on anticoagulation with Soraya Enrique MD as PCP - Jerri Morrison MD as Consulting Physician (Cardiovascular Diseases)  Butch Ochoa MD as Consulting Physician ([facility])  Nino Kennedy MD ([address])  Emily Prather MD ([facility])  Genevieve Arias MD (UROLOGY) Hip arthritis (5/9/2016), History of cardiac murmur, Iliac artery aneurysm, bilateral (HCC) (5/9/2016), Mitral valve disorders(424.0), Mitral valve insufficiency (1/9/2014), Pacemaker (7/28/2015), PUD (peptic ulcer disease) (11/1966), Renal cyst (5/9/2016) medications per cardiology       Diet assessment: good     PLAN:  The patient indicates understanding of these issues and agrees to the plan. Reinforced healthy diet, lifestyle, and exercise. Imaging studies ordered. Lab work ordered.   Patient reassured Prostate Cancer Screening      PSA  Annually PSA due on 05/28/2022  Update Health Maintenance if applicable     Immunizations (Update Immunization Activity if applicable)     Influenza  Covered Annually    Please get every year    Pneumococcal 13 (Prevn mouth nightly. • Multiple Vitamin (MULTIVITAMIN OR) Take 1 tablet by mouth daily. Allergies:No Known Allergies   PHYSICAL EXAM:   Physical Exam   Constitutional: He is oriented to person, place, and time. No distress.    HENT:   Right Ear: Exter encounter       Imaging & Referrals:  US ABDOMINAL AORTIC ANEURYSM SCREENING (CPT=76706)  US ABDOMEN COMPLETE (CPT=76700)       JT#[id number]

## 2021-01-04 ENCOUNTER — TELEPHONE (OUTPATIENT)
Dept: FAMILY MEDICINE CLINIC | Facility: CLINIC | Age: 71
End: 2021-01-04

## 2021-01-04 NOTE — TELEPHONE ENCOUNTER
In the next week or 2 pt will have a chance to get the COVID vaccine. Pt is asking if PCP objects to him getting it.

## 2021-03-12 DIAGNOSIS — Z23 NEED FOR VACCINATION: ICD-10-CM

## 2021-05-18 ENCOUNTER — LAB ENCOUNTER (OUTPATIENT)
Dept: LAB | Age: 71
End: 2021-05-18
Attending: FAMILY MEDICINE
Payer: MEDICARE

## 2021-05-18 DIAGNOSIS — E53.8 VITAMIN B12 DEFICIENCY: ICD-10-CM

## 2021-05-18 DIAGNOSIS — Z00.00 ENCOUNTER FOR MEDICARE ANNUAL WELLNESS EXAM: ICD-10-CM

## 2021-05-18 DIAGNOSIS — E55.9 VITAMIN D DEFICIENCY: ICD-10-CM

## 2021-05-18 DIAGNOSIS — B18.2 CHRONIC HEPATITIS C WITHOUT HEPATIC COMA (HCC): ICD-10-CM

## 2021-05-18 DIAGNOSIS — Z12.5 SCREENING FOR MALIGNANT NEOPLASM OF PROSTATE: ICD-10-CM

## 2021-05-18 PROCEDURE — 87522 HEPATITIS C REVRS TRNSCRPJ: CPT

## 2021-05-18 PROCEDURE — 80061 LIPID PANEL: CPT

## 2021-05-18 PROCEDURE — 85025 COMPLETE CBC W/AUTO DIFF WBC: CPT

## 2021-05-18 PROCEDURE — 84443 ASSAY THYROID STIM HORMONE: CPT

## 2021-05-18 PROCEDURE — 80053 COMPREHEN METABOLIC PANEL: CPT

## 2021-05-18 PROCEDURE — 36415 COLL VENOUS BLD VENIPUNCTURE: CPT

## 2021-05-18 PROCEDURE — 82306 VITAMIN D 25 HYDROXY: CPT

## 2021-05-18 PROCEDURE — 82607 VITAMIN B-12: CPT

## 2021-07-19 ENCOUNTER — OFFICE VISIT (OUTPATIENT)
Dept: FAMILY MEDICINE CLINIC | Facility: CLINIC | Age: 71
End: 2021-07-19
Payer: MEDICARE

## 2021-07-19 VITALS
RESPIRATION RATE: 14 BRPM | HEART RATE: 60 BPM | WEIGHT: 175 LBS | TEMPERATURE: 98 F | BODY MASS INDEX: 26.52 KG/M2 | OXYGEN SATURATION: 99 % | HEIGHT: 68 IN | DIASTOLIC BLOOD PRESSURE: 70 MMHG | SYSTOLIC BLOOD PRESSURE: 110 MMHG

## 2021-07-19 DIAGNOSIS — Z00.00 ENCOUNTER FOR ANNUAL HEALTH EXAMINATION: ICD-10-CM

## 2021-07-19 DIAGNOSIS — Z00.00 ENCOUNTER FOR ANNUAL WELLNESS EXAM IN MEDICARE PATIENT: Primary | ICD-10-CM

## 2021-07-19 DIAGNOSIS — M25.511 ACUTE PAIN OF RIGHT SHOULDER: ICD-10-CM

## 2021-07-19 DIAGNOSIS — I72.3 ILIAC ARTERY ANEURYSM, BILATERAL (HCC): ICD-10-CM

## 2021-07-19 PROCEDURE — 99213 OFFICE O/P EST LOW 20 MIN: CPT | Performed by: FAMILY MEDICINE

## 2021-07-19 PROCEDURE — G0439 PPPS, SUBSEQ VISIT: HCPCS | Performed by: FAMILY MEDICINE

## 2021-07-19 RX ORDER — APIXABAN 5 MG/1
5 TABLET, FILM COATED ORAL 2 TIMES DAILY
COMMUNITY
Start: 2021-06-30

## 2021-07-19 RX ORDER — CYCLOBENZAPRINE HCL 10 MG
10 TABLET ORAL 3 TIMES DAILY
Qty: 30 TABLET | Refills: 1 | Status: SHIPPED | OUTPATIENT
Start: 2021-07-19 | End: 2021-08-08

## 2021-07-19 RX ORDER — KETOCONAZOLE 20 MG/G
CREAM TOPICAL
COMMUNITY
Start: 2021-03-30

## 2021-07-19 NOTE — PROGRESS NOTES
HPI:   Kedar Bundy is a 80year old male who presents for a Medicare Subsequent Annual Wellness visit (Pt already had Initial Annual Wellness).     Mr. Willian Faye is a pleasant 81 y/o M with history of atrial fibrillation on anticoagulation with Eliquis, sta Patient Care Team:  Omid Batista MD as PCP - Jose Armando Haider MD as Consulting Physician (Cardiovascular Diseases)  Barbara Layton MD as Consulting Physician ([place] [address])  Ольга Yuan MD ([address])  Ant Yanes MD [facility] Acute hepatitis C without mention of hepatic coma(070.51) (1/2006), Bladder wall thickening (5/9/2016), Flatulence/gas pain/belching, Hip arthritis (5/9/2016), History of cardiac murmur, Iliac artery aneurysm, bilateral (HCC) (5/9/2016), Mitral valve disor Hearing grossly normal bilaterally           Visual Acuity                           Constitutional: He is oriented to person, place, and time. No distress.    HENT:   Right Ear: External ear normal.   Left Ear: External ear normal.   Mouth/Throat: Zamzam Shelley tablet (10 mg total) by mouth 3 (three) times daily for 20 days. Diet assessment: good     PLAN:  The patient indicates understanding of these issues and agrees to the plan. Continue with current treatment plan. Follow up in  1  year(s).   Imaging 02/11/2019      Ultrasound Screening for Abdominal Aortic Aneurysm (AAA) Covered once in a lifetime for one of the following risk factors   • Men who are 73-68 years old and have ever smoked   • Anyone with a family history -     Colorectal Cancer Screenin

## 2021-07-19 NOTE — PATIENT INSTRUCTIONS
Thank you for choosing Govind Lovelace MD at [place] [address]  To Do: [address]  1. Please see age appropriate health prevention below    [facility] [employer] is located in [address]. Monday, Tuesday & Friday – 8 a.m. to 4 p.m.   Wednesday, Thursd the benefits outweigh those potential risks and we strive to make you healthier and to improve your quality of life.     Referrals, and Radiology Information:    If your insurance requires a referral to a specialist, please allow 5 business days to process have ever smoked. Men in this age group who have never smoked could still be offered screening, depending on their family history or other risk factors they may have.  1-time ultrasound   Unhealthy alcohol use All men in this age group At routine exams   Bl this age group At least every 5 years   HIV Men at increased risk for infection At routine exams   Lung cancer Men ages 54 to 76 who are in fairly good health and are at higher risk for lung cancer  · Currently smoke or who have quite within the past 15 ye healthcare provider if you need a booster dose   Pneumococcal conjugate vaccine (PCV13) and pneumococcal polysaccharide vaccine (PPSV23) PPSV 23: All men in this age group who have not been vaccinated or have not had infection  PCV 13:  Men at risk for infe Please check with your insurance carrier before scheduling to verify coverage.    PREVENTATIVE SERVICES FREQUENCY &  COVERAGE DETAILS LAST COMPLETION DATE   Diabetes Screening    Fasting Blood Sugar / Glucose    One screening every 12 months if never test Hepatitis B One screening covered for patients with certain risk factors   -  No recommendations at this time    Tetanus Toxoid Not covered by Medicare Part B unless medically necessary (cut with metal); may be covered with your pharmacy prescription benef

## 2021-11-12 ENCOUNTER — HOSPITAL ENCOUNTER (OUTPATIENT)
Dept: ULTRASOUND IMAGING | Age: 71
Discharge: HOME | End: 2021-11-12
Attending: FAMILY MEDICINE
Payer: MEDICARE

## 2021-11-12 DIAGNOSIS — I72.3 ILIAC ARTERY ANEURYSM, BILATERAL (HCC): ICD-10-CM

## 2021-11-12 PROCEDURE — 76700 US EXAM ABDOM COMPLETE: CPT | Performed by: FAMILY MEDICINE

## 2021-11-17 ENCOUNTER — TELEPHONE (OUTPATIENT)
Dept: FAMILY MEDICINE CLINIC | Facility: CLINIC | Age: 71
End: 2021-11-17

## 2021-11-17 NOTE — TELEPHONE ENCOUNTER
Pt states that he had his ultrasound done and he was looking for the results of it. Can a nurse call him with the results?

## 2021-11-17 NOTE — TELEPHONE ENCOUNTER
1. A 1.2 x 1.2 x 2.0 cm echogenic lesion in the lower pole left kidney is relatively stable.  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       2. A 3 mm gallbladder polyp is noted.        US reviewed which show

## 2021-11-18 ENCOUNTER — ORDER TRANSCRIPTION (OUTPATIENT)
Dept: PHYSICAL THERAPY | Facility: HOSPITAL | Age: 71
End: 2021-11-18

## 2021-11-18 DIAGNOSIS — M16.12 PRIMARY OSTEOARTHRITIS OF LEFT HIP: Primary | ICD-10-CM

## 2022-01-04 ENCOUNTER — OFFICE VISIT (OUTPATIENT)
Dept: FAMILY MEDICINE CLINIC | Facility: CLINIC | Age: 72
End: 2022-01-04
Payer: MEDICARE

## 2022-01-04 VITALS
SYSTOLIC BLOOD PRESSURE: 114 MMHG | OXYGEN SATURATION: 98 % | BODY MASS INDEX: 26.67 KG/M2 | WEIGHT: 176 LBS | RESPIRATION RATE: 14 BRPM | DIASTOLIC BLOOD PRESSURE: 70 MMHG | TEMPERATURE: 97 F | HEIGHT: 68 IN | HEART RATE: 62 BPM

## 2022-01-04 DIAGNOSIS — Z12.5 SCREENING FOR MALIGNANT NEOPLASM OF PROSTATE: ICD-10-CM

## 2022-01-04 DIAGNOSIS — Z13.228 SCREENING FOR ENDOCRINE, METABOLIC AND IMMUNITY DISORDER: ICD-10-CM

## 2022-01-04 DIAGNOSIS — Z13.29 SCREENING FOR ENDOCRINE, METABOLIC AND IMMUNITY DISORDER: ICD-10-CM

## 2022-01-04 DIAGNOSIS — E55.9 VITAMIN D DEFICIENCY: ICD-10-CM

## 2022-01-04 DIAGNOSIS — S16.1XXA STRAIN OF NECK MUSCLE, INITIAL ENCOUNTER: Primary | ICD-10-CM

## 2022-01-04 DIAGNOSIS — Z13.0 SCREENING FOR ENDOCRINE, METABOLIC AND IMMUNITY DISORDER: ICD-10-CM

## 2022-01-04 DIAGNOSIS — E53.8 VITAMIN B12 DEFICIENCY: ICD-10-CM

## 2022-01-04 PROCEDURE — 99213 OFFICE O/P EST LOW 20 MIN: CPT | Performed by: FAMILY MEDICINE

## 2022-01-04 RX ORDER — ACETAMINOPHEN 160 MG
2000 TABLET,DISINTEGRATING ORAL DAILY
COMMUNITY

## 2022-01-04 NOTE — PROGRESS NOTES
Subjective:   Patient ID: Mauricio Ramirez is a 80year old male. HPI  Mr. Delilah Hollis is a pleasant [birth year]-WHLN-YCA gentleman well-known to me presenting for neck pain for the past 8 weeks. This is left-sided. It hurts when he looks to his left.   He does not kn Conjunctiva/sclera: Conjunctivae normal.   Neck:      Thyroid: No thyroid mass. Cardiovascular:      Rate and Rhythm: Normal rate and regular rhythm. Heart sounds: Normal heart sounds. No murmur heard.       Pulmonary:      Effort: Pulmonary effort PSA (Screening) [E]      Vitamin B12 [E]      Vitamin D [E]      Meds This Visit:  Requested Prescriptions      No prescriptions requested or ordered in this encounter       Imaging & Referrals:  None

## 2022-01-04 NOTE — PATIENT INSTRUCTIONS
Thank you for choosing Letitia Hill MD at [place] [address]  To Do: [address]  1. Please see info below  [employer] is located in [address]. Monday, Tuesday & Friday – 8 a.m. to 4 p.m. Wednesday, Thursday – 7 a.m. to 3 p.m.   The lab potential risks and we strive to make you healthier and to improve your quality of life.     Referrals, and Radiology Information:    If your insurance requires a referral to a specialist, please allow 5 business days to process your referral request.    If to improve. · When lying down, use a comfortable pillow or a rolled towel that supports the head and keeps the spine in a neutral position. The position of the head should not be tilted forward or backward.   · Apply an ice pack over the injured area for 1 reserved. This information is not intended as a substitute for professional medical care. Always follow your healthcare professional's instructions.

## 2022-02-14 ENCOUNTER — APPOINTMENT (OUTPATIENT)
Dept: LAB | Facility: HOSPITAL | Age: 72
End: 2022-02-14
Attending: FAMILY MEDICINE
Payer: MEDICARE

## 2022-02-14 ENCOUNTER — LAB ENCOUNTER (OUTPATIENT)
Dept: LAB | Age: 72
End: 2022-02-14
Attending: FAMILY MEDICINE
Payer: MEDICARE

## 2022-02-14 ENCOUNTER — HOSPITAL ENCOUNTER (OUTPATIENT)
Dept: CT IMAGING | Age: 72
Discharge: HOME | End: 2022-02-14
Attending: PHYSICAL MEDICINE & REHABILITATION
Payer: MEDICARE

## 2022-02-14 DIAGNOSIS — Z13.29 SCREENING FOR ENDOCRINE, METABOLIC AND IMMUNITY DISORDER: ICD-10-CM

## 2022-02-14 DIAGNOSIS — E53.8 VITAMIN B12 DEFICIENCY: ICD-10-CM

## 2022-02-14 DIAGNOSIS — Z12.5 SCREENING FOR MALIGNANT NEOPLASM OF PROSTATE: ICD-10-CM

## 2022-02-14 DIAGNOSIS — Z13.0 SCREENING FOR ENDOCRINE, METABOLIC AND IMMUNITY DISORDER: ICD-10-CM

## 2022-02-14 DIAGNOSIS — Z13.228 SCREENING FOR ENDOCRINE, METABOLIC AND IMMUNITY DISORDER: ICD-10-CM

## 2022-02-14 DIAGNOSIS — M16.12 PRIMARY OSTEOARTHRITIS OF LEFT HIP: ICD-10-CM

## 2022-02-14 DIAGNOSIS — E55.9 VITAMIN D DEFICIENCY: ICD-10-CM

## 2022-02-14 LAB
ALBUMIN SERPL-MCNC: 3.5 G/DL (ref 3.4–5)
ALBUMIN/GLOB SERPL: 1 {RATIO} (ref 1–2)
ALP LIVER SERPL-CCNC: 62 U/L
ALT SERPL-CCNC: 23 U/L
ANION GAP SERPL CALC-SCNC: 3 MMOL/L (ref 0–18)
AST SERPL-CCNC: 28 U/L (ref 15–37)
BASOPHILS # BLD AUTO: 0.03 X10(3) UL (ref 0–0.2)
BASOPHILS NFR BLD AUTO: 0.7 %
BILIRUB SERPL-MCNC: 0.6 MG/DL (ref 0.1–2)
BUN BLD-MCNC: 16 MG/DL (ref 7–18)
CALCIUM BLD-MCNC: 8.8 MG/DL (ref 8.5–10.1)
CHLORIDE SERPL-SCNC: 107 MMOL/L (ref 98–112)
CHOLEST SERPL-MCNC: 146 MG/DL (ref ?–200)
CO2 SERPL-SCNC: 31 MMOL/L (ref 21–32)
COMPLEXED PSA SERPL-MCNC: 1.72 NG/ML (ref ?–4)
CREAT BLD-MCNC: 0.8 MG/DL
EOSINOPHIL # BLD AUTO: 0.13 X10(3) UL (ref 0–0.7)
EOSINOPHIL NFR BLD AUTO: 3.1 %
ERYTHROCYTE [DISTWIDTH] IN BLOOD BY AUTOMATED COUNT: 13.7 %
FASTING PATIENT LIPID ANSWER: YES
FASTING STATUS PATIENT QL REPORTED: YES
GLOBULIN PLAS-MCNC: 3.6 G/DL (ref 2.8–4.4)
GLUCOSE BLD-MCNC: 86 MG/DL (ref 70–99)
HCT VFR BLD AUTO: 44.7 %
HDLC SERPL-MCNC: 53 MG/DL (ref 40–59)
HGB BLD-MCNC: 13.6 G/DL
IMM GRANULOCYTES # BLD AUTO: 0 X10(3) UL (ref 0–1)
IMM GRANULOCYTES NFR BLD: 0 %
LDLC SERPL CALC-MCNC: 82 MG/DL (ref ?–100)
LYMPHOCYTES # BLD AUTO: 0.93 X10(3) UL (ref 1–4)
LYMPHOCYTES NFR BLD AUTO: 21.9 %
MCH RBC QN AUTO: 29.9 PG (ref 26–34)
MCHC RBC AUTO-ENTMCNC: 30.4 G/DL (ref 31–37)
MCV RBC AUTO: 98.2 FL
MONOCYTES # BLD AUTO: 0.47 X10(3) UL (ref 0.1–1)
MONOCYTES NFR BLD AUTO: 11.1 %
NEUTROPHILS # BLD AUTO: 2.68 X10 (3) UL (ref 1.5–7.7)
NEUTROPHILS # BLD AUTO: 2.68 X10(3) UL (ref 1.5–7.7)
NEUTROPHILS NFR BLD AUTO: 63.2 %
NONHDLC SERPL-MCNC: 93 MG/DL (ref ?–130)
OSMOLALITY SERPL CALC.SUM OF ELEC: 292 MOSM/KG (ref 275–295)
PLATELET # BLD AUTO: 201 10(3)UL (ref 150–450)
POTASSIUM SERPL-SCNC: 4.4 MMOL/L (ref 3.5–5.1)
PROT SERPL-MCNC: 7.1 G/DL (ref 6.4–8.2)
RBC # BLD AUTO: 4.55 X10(6)UL
SODIUM SERPL-SCNC: 141 MMOL/L (ref 136–145)
T4 FREE SERPL-MCNC: 0.8 NG/DL (ref 0.8–1.7)
TRIGL SERPL-MCNC: 54 MG/DL (ref 30–149)
TSI SER-ACNC: 1.65 MIU/ML (ref 0.36–3.74)
VIT B12 SERPL-MCNC: 483 PG/ML (ref 193–986)
VIT D+METAB SERPL-MCNC: 33.6 NG/ML (ref 30–100)
VLDLC SERPL CALC-MCNC: 8 MG/DL (ref 0–30)
WBC # BLD AUTO: 4.2 X10(3) UL (ref 4–11)

## 2022-02-14 PROCEDURE — 36415 COLL VENOUS BLD VENIPUNCTURE: CPT

## 2022-02-14 PROCEDURE — 76377 3D RENDER W/INTRP POSTPROCES: CPT | Performed by: PHYSICAL MEDICINE & REHABILITATION

## 2022-02-14 PROCEDURE — 82607 VITAMIN B-12: CPT

## 2022-02-14 PROCEDURE — 84439 ASSAY OF FREE THYROXINE: CPT

## 2022-02-14 PROCEDURE — 85025 COMPLETE CBC W/AUTO DIFF WBC: CPT

## 2022-02-14 PROCEDURE — 82306 VITAMIN D 25 HYDROXY: CPT

## 2022-02-14 PROCEDURE — 80053 COMPREHEN METABOLIC PANEL: CPT

## 2022-02-14 PROCEDURE — 80061 LIPID PANEL: CPT

## 2022-02-14 PROCEDURE — 84443 ASSAY THYROID STIM HORMONE: CPT

## 2022-02-14 PROCEDURE — 73700 CT LOWER EXTREMITY W/O DYE: CPT | Performed by: PHYSICAL MEDICINE & REHABILITATION

## 2022-03-04 ENCOUNTER — TELEPHONE (OUTPATIENT)
Dept: FAMILY MEDICINE CLINIC | Facility: CLINIC | Age: 72
End: 2022-03-04

## 2022-03-04 NOTE — TELEPHONE ENCOUNTER
Spoke with pt and reviewed lab results, pt verbalized understanding and expressed appreciation. Pt requested a copy of his lab results mailed to his home, confirmed pt home address.      Copy of pt lab results placed in mail to pt's home:    Mr. Alexsandra Capone  [phone]

## 2022-03-04 NOTE — TELEPHONE ENCOUNTER
Pt is requesting a nurse to talk to him and let him know what his test results are and what they mean.

## 2022-06-29 NOTE — PROGRESS NOTES
Dx: Cervical OA         Authorized # of Visits:  12         Next MD visit: none scheduled  Fall Risk: standard         Precautions: n/a             Subjective: The patient states that the exercises are doing him well to this point.   Had some left sided [place] [Follow-Up: _____] : a [unfilled] follow-up visit [FreeTextEntry1] : left subdural hematoma

## 2022-07-02 ENCOUNTER — OFFICE VISIT (OUTPATIENT)
Dept: FAMILY MEDICINE CLINIC | Facility: CLINIC | Age: 72
End: 2022-07-02
Payer: MEDICARE

## 2022-07-02 ENCOUNTER — HOSPITAL ENCOUNTER (EMERGENCY)
Age: 72
Discharge: HOME | End: 2022-07-02
Attending: EMERGENCY MEDICINE
Payer: MEDICARE

## 2022-07-02 ENCOUNTER — APPOINTMENT (OUTPATIENT)
Dept: GENERAL RADIOLOGY | Age: 72
End: 2022-07-02
Attending: EMERGENCY MEDICINE
Payer: MEDICARE

## 2022-07-02 VITALS
SYSTOLIC BLOOD PRESSURE: 132 MMHG | WEIGHT: 180 LBS | HEIGHT: 69 IN | BODY MASS INDEX: 26.66 KG/M2 | DIASTOLIC BLOOD PRESSURE: 86 MMHG | HEART RATE: 96 BPM | RESPIRATION RATE: 18 BRPM | OXYGEN SATURATION: 96 % | TEMPERATURE: 100 F

## 2022-07-02 DIAGNOSIS — U07.1 COVID-19: Primary | ICD-10-CM

## 2022-07-02 DIAGNOSIS — Z02.9 ENCOUNTERS FOR ADMINISTRATIVE PURPOSES: Primary | ICD-10-CM

## 2022-07-02 LAB
ALBUMIN SERPL-MCNC: 3.5 G/DL (ref 3.4–5)
ALBUMIN/GLOB SERPL: 0.9 {RATIO} (ref 1–2)
ALP LIVER SERPL-CCNC: 54 U/L
ALT SERPL-CCNC: 22 U/L
ANION GAP SERPL CALC-SCNC: 6 MMOL/L (ref 0–18)
AST SERPL-CCNC: 26 U/L (ref 15–37)
ATRIAL RATE: 99 BPM
BASOPHILS # BLD AUTO: 0.02 X10(3) UL (ref 0–0.2)
BASOPHILS NFR BLD AUTO: 0.3 %
BILIRUB SERPL-MCNC: 0.6 MG/DL (ref 0.1–2)
BILIRUB UR QL STRIP.AUTO: NEGATIVE
BUN BLD-MCNC: 11 MG/DL (ref 7–18)
CALCIUM BLD-MCNC: 8.9 MG/DL (ref 8.5–10.1)
CHLORIDE SERPL-SCNC: 101 MMOL/L (ref 98–112)
CLARITY UR REFRACT.AUTO: CLEAR
CO2 SERPL-SCNC: 30 MMOL/L (ref 21–32)
COLOR UR AUTO: YELLOW
CREAT BLD-MCNC: 0.82 MG/DL
EOSINOPHIL # BLD AUTO: 0.02 X10(3) UL (ref 0–0.7)
EOSINOPHIL NFR BLD AUTO: 0.3 %
ERYTHROCYTE [DISTWIDTH] IN BLOOD BY AUTOMATED COUNT: 13.3 %
GLOBULIN PLAS-MCNC: 3.9 G/DL (ref 2.8–4.4)
GLUCOSE BLD-MCNC: 91 MG/DL (ref 70–99)
GLUCOSE UR STRIP.AUTO-MCNC: NEGATIVE MG/DL
HCT VFR BLD AUTO: 41.8 %
HGB BLD-MCNC: 13.4 G/DL
IMM GRANULOCYTES # BLD AUTO: 0.04 X10(3) UL (ref 0–1)
IMM GRANULOCYTES NFR BLD: 0.7 %
KETONES UR STRIP.AUTO-MCNC: 15 MG/DL
LEUKOCYTE ESTERASE UR QL STRIP.AUTO: NEGATIVE
LYMPHOCYTES # BLD AUTO: 0.72 X10(3) UL (ref 1–4)
LYMPHOCYTES NFR BLD AUTO: 12.2 %
MCH RBC QN AUTO: 31.2 PG (ref 26–34)
MCHC RBC AUTO-ENTMCNC: 32.1 G/DL (ref 31–37)
MCV RBC AUTO: 97.2 FL
MONOCYTES # BLD AUTO: 0.98 X10(3) UL (ref 0.1–1)
MONOCYTES NFR BLD AUTO: 16.6 %
NEUTROPHILS # BLD AUTO: 4.11 X10 (3) UL (ref 1.5–7.7)
NEUTROPHILS # BLD AUTO: 4.11 X10(3) UL (ref 1.5–7.7)
NEUTROPHILS NFR BLD AUTO: 69.9 %
NITRITE UR QL STRIP.AUTO: NEGATIVE
OSMOLALITY SERPL CALC.SUM OF ELEC: 283 MOSM/KG (ref 275–295)
P-R INTERVAL: 208 MS
PH UR STRIP.AUTO: 6.5 [PH] (ref 5–8)
PLATELET # BLD AUTO: 162 10(3)UL (ref 150–450)
POTASSIUM SERPL-SCNC: 4.1 MMOL/L (ref 3.5–5.1)
PROT SERPL-MCNC: 7.4 G/DL (ref 6.4–8.2)
Q-T INTERVAL: 312 MS
QRS DURATION: 94 MS
QTC CALCULATION (BEZET): 400 MS
R AXIS: 246 DEGREES
RBC # BLD AUTO: 4.3 X10(6)UL
SARS-COV-2 RNA RESP QL NAA+PROBE: DETECTED
SODIUM SERPL-SCNC: 137 MMOL/L (ref 136–145)
SP GR UR STRIP.AUTO: 1.02 (ref 1–1.03)
T AXIS: 30 DEGREES
UROBILINOGEN UR STRIP.AUTO-MCNC: 0.2 MG/DL
VENTRICULAR RATE: 99 BPM
WBC # BLD AUTO: 5.9 X10(3) UL (ref 4–11)

## 2022-07-02 PROCEDURE — 93010 ELECTROCARDIOGRAM REPORT: CPT

## 2022-07-02 PROCEDURE — 99284 EMERGENCY DEPT VISIT MOD MDM: CPT

## 2022-07-02 PROCEDURE — 80053 COMPREHEN METABOLIC PANEL: CPT | Performed by: EMERGENCY MEDICINE

## 2022-07-02 PROCEDURE — 71045 X-RAY EXAM CHEST 1 VIEW: CPT | Performed by: EMERGENCY MEDICINE

## 2022-07-02 PROCEDURE — 85025 COMPLETE CBC W/AUTO DIFF WBC: CPT | Performed by: EMERGENCY MEDICINE

## 2022-07-02 PROCEDURE — 93005 ELECTROCARDIOGRAM TRACING: CPT

## 2022-07-02 PROCEDURE — 81001 URINALYSIS AUTO W/SCOPE: CPT | Performed by: EMERGENCY MEDICINE

## 2022-07-02 RX ORDER — BEBTELOVIMAB 87.5 MG/ML
175 INJECTION, SOLUTION INTRAVENOUS ONCE
Status: COMPLETED | OUTPATIENT
Start: 2022-07-02 | End: 2022-07-02

## 2022-07-02 RX ORDER — METOPROLOL SUCCINATE 25 MG/1
25 TABLET, EXTENDED RELEASE ORAL DAILY
COMMUNITY

## 2022-07-02 NOTE — ED INITIAL ASSESSMENT (HPI)
Fever and sore throat began yesterday--went to walk in clinic but sent here due to recent hx of a-fib- denies sob with normal activity

## 2022-07-04 ENCOUNTER — TELEPHONE (OUTPATIENT)
Dept: CASE MANAGEMENT | Age: 72
End: 2022-07-04

## 2022-07-04 NOTE — TELEPHONE ENCOUNTER
Pt received MAB infusion at  ED on 7/2/22 for COVID-19. Please follow-up with pt for post-infusion assessment and home monitoring if needed. Thank you.

## 2022-07-05 ENCOUNTER — TELEMEDICINE (OUTPATIENT)
Dept: FAMILY MEDICINE CLINIC | Facility: CLINIC | Age: 72
End: 2022-07-05

## 2022-07-05 DIAGNOSIS — U07.1 COVID-19: Primary | ICD-10-CM

## 2022-07-05 PROCEDURE — 99213 OFFICE O/P EST LOW 20 MIN: CPT | Performed by: FAMILY MEDICINE

## 2022-07-05 NOTE — TELEPHONE ENCOUNTER
Pt has VV with Dr. Galileo Guilelrmo today.     Future Appointments   Date Time Provider [facility]   7/5/2022 12:00 PM Ting Mills DO [facility] [address]   7/28/2022  8:00 AM Camden Mcfarlane MD [facility] [address]

## 2022-07-28 ENCOUNTER — TELEPHONE (OUTPATIENT)
Dept: FAMILY MEDICINE CLINIC | Facility: CLINIC | Age: 72
End: 2022-07-28

## 2022-07-28 ENCOUNTER — OFFICE VISIT (OUTPATIENT)
Dept: FAMILY MEDICINE CLINIC | Facility: CLINIC | Age: 72
End: 2022-07-28
Payer: MEDICARE

## 2022-07-28 VITALS
RESPIRATION RATE: 16 BRPM | TEMPERATURE: 98 F | DIASTOLIC BLOOD PRESSURE: 70 MMHG | WEIGHT: 183 LBS | BODY MASS INDEX: 27.11 KG/M2 | OXYGEN SATURATION: 98 % | HEART RATE: 100 BPM | SYSTOLIC BLOOD PRESSURE: 122 MMHG | HEIGHT: 69 IN

## 2022-07-28 DIAGNOSIS — E55.9 VITAMIN D DEFICIENCY: ICD-10-CM

## 2022-07-28 DIAGNOSIS — Z00.00 ENCOUNTER FOR ANNUAL HEALTH EXAMINATION: ICD-10-CM

## 2022-07-28 DIAGNOSIS — E53.8 VITAMIN B12 DEFICIENCY: ICD-10-CM

## 2022-07-28 DIAGNOSIS — D69.6 THROMBOCYTOPENIA (HCC): ICD-10-CM

## 2022-07-28 DIAGNOSIS — Z13.6 SCREENING FOR AAA (ABDOMINAL AORTIC ANEURYSM): ICD-10-CM

## 2022-07-28 DIAGNOSIS — Z00.00 ENCOUNTER FOR ANNUAL WELLNESS EXAM IN MEDICARE PATIENT: Primary | ICD-10-CM

## 2022-07-28 DIAGNOSIS — Z12.5 SCREENING FOR MALIGNANT NEOPLASM OF PROSTATE: ICD-10-CM

## 2022-07-28 DIAGNOSIS — I72.3 ILIAC ARTERY ANEURYSM, BILATERAL (HCC): ICD-10-CM

## 2022-07-28 DIAGNOSIS — B18.2 CHRONIC HEPATITIS C WITHOUT HEPATIC COMA (HCC): ICD-10-CM

## 2022-07-28 DIAGNOSIS — I48.92 ATRIAL FLUTTER, UNSPECIFIED TYPE (HCC): ICD-10-CM

## 2022-07-28 PROCEDURE — G0439 PPPS, SUBSEQ VISIT: HCPCS | Performed by: FAMILY MEDICINE

## 2022-07-28 PROCEDURE — 1126F AMNT PAIN NOTED NONE PRSNT: CPT | Performed by: FAMILY MEDICINE

## 2022-07-28 NOTE — TELEPHONE ENCOUNTER
Provided information below from Dr. Nury Linder to pt, verbalized understanding. Pt will have labs and US done. Pt expressed his appreciation for getting back to him so quickly, asked this writer to let doctor know.

## 2022-07-28 NOTE — TELEPHONE ENCOUNTER
Please call Ulices gomez I had a thorough search of what tests he had recently. He had CT scan done in February of this year. No mention of aneurysm. It showed however that his prostate gland is enlarged. He does have arthritis. However I still would want him to have that abdominal ultrasound that I had ordered.

## 2022-08-25 ENCOUNTER — HOSPITAL ENCOUNTER (OUTPATIENT)
Dept: ULTRASOUND IMAGING | Age: 72
Discharge: HOME | End: 2022-08-25
Attending: FAMILY MEDICINE
Payer: MEDICARE

## 2022-08-25 DIAGNOSIS — Z13.6 SCREENING FOR AAA (ABDOMINAL AORTIC ANEURYSM): ICD-10-CM

## 2022-08-25 PROCEDURE — 76770 US EXAM ABDO BACK WALL COMP: CPT | Performed by: FAMILY MEDICINE

## 2022-09-01 ENCOUNTER — TELEPHONE (OUTPATIENT)
Dept: FAMILY MEDICINE CLINIC | Facility: CLINIC | Age: 72
End: 2022-09-01

## 2022-09-01 DIAGNOSIS — I72.3 ILIAC ARTERY ANEURYSM, BILATERAL (HCC): Primary | ICD-10-CM

## 2023-01-01 ENCOUNTER — HOSPITAL ENCOUNTER (EMERGENCY)
Age: 73
Discharge: HOME | End: 2023-01-01
Payer: MEDICARE

## 2023-01-01 VITALS
OXYGEN SATURATION: 95 % | BODY MASS INDEX: 27 KG/M2 | DIASTOLIC BLOOD PRESSURE: 80 MMHG | SYSTOLIC BLOOD PRESSURE: 133 MMHG | RESPIRATION RATE: 18 BRPM | TEMPERATURE: 98 F | HEART RATE: 73 BPM | WEIGHT: 185 LBS

## 2023-01-01 DIAGNOSIS — J06.9 VIRAL URI: Primary | ICD-10-CM

## 2023-01-01 PROCEDURE — 99282 EMERGENCY DEPT VISIT SF MDM: CPT

## 2023-01-01 NOTE — DISCHARGE INSTRUCTIONS
Rest and drink plenty of fluids. This will help to thin the mucous in the back of your throat. Take Tylenol and/or ibuprofen as needed for pain or fever. Use Zyrtec, Claritin, or Allegra to help with nasal drainage. You can take this twice a day to help with congestion. Take Flonase or Nasonex nasal spray 1 spray each nostril twice a day to help with congestion as well. This may also help with ear pressure. Take Robitussin or Delsym as needed for cough. Follow up with your PCP in 5 days. Your symptoms should improve in the next 7-10 days; however, the cough can linger for much longer. Thank you for choosing [facility] for your care.

## 2023-01-06 ENCOUNTER — TELEPHONE (OUTPATIENT)
Dept: ORTHOPEDICS CLINIC | Facility: CLINIC | Age: 73
End: 2023-01-06

## 2023-01-06 ENCOUNTER — HOSPITAL ENCOUNTER (EMERGENCY)
Age: 73
Discharge: HOME | End: 2023-01-06
Attending: EMERGENCY MEDICINE
Payer: MEDICARE

## 2023-01-06 VITALS
HEART RATE: 75 BPM | OXYGEN SATURATION: 98 % | DIASTOLIC BLOOD PRESSURE: 81 MMHG | SYSTOLIC BLOOD PRESSURE: 142 MMHG | HEIGHT: 69 IN | BODY MASS INDEX: 27.4 KG/M2 | RESPIRATION RATE: 16 BRPM | TEMPERATURE: 99 F | WEIGHT: 185 LBS

## 2023-01-06 DIAGNOSIS — M79.642 LEFT HAND PAIN: Primary | ICD-10-CM

## 2023-01-06 DIAGNOSIS — R22.32 LOCALIZED SWELLING OF LEFT THUMB: Primary | ICD-10-CM

## 2023-01-06 PROCEDURE — 99283 EMERGENCY DEPT VISIT LOW MDM: CPT

## 2023-01-06 RX ORDER — CEPHALEXIN 500 MG/1
500 CAPSULE ORAL 4 TIMES DAILY
Qty: 40 CAPSULE | Refills: 0 | Status: SHIPPED | OUTPATIENT
Start: 2023-01-06 | End: 2023-01-16

## 2023-01-06 RX ORDER — METHYLPREDNISOLONE 4 MG/1
TABLET ORAL
Qty: 1 EACH | Refills: 0 | Status: SHIPPED | OUTPATIENT
Start: 2023-01-06

## 2023-01-06 NOTE — DISCHARGE INSTRUCTIONS
Unclear etiology of swelling of your left thumb. Could be an inflammatory arthritis such as gout. Less likely an infection. We will treat with a Medrol Dosepak as well as antibiotics. Should follow-up with orthopedics on Monday. Return to the ER if fevers, increasing redness or pain.

## 2023-01-06 NOTE — TELEPHONE ENCOUNTER
Reviewed patients chart, xray orders are required. Order placed for left hand xrays.  Please contact patient advise to arrive 30 mins prior to patients appt to complete x-ray order and schedule patients xray appt-Thank you

## 2023-01-06 NOTE — TELEPHONE ENCOUNTER
Patient is coming in  for left hand pain currently there is no  imagining on file. Please create order for xray and I will notify patient to arrive  20 minutes prior to appointment time for imaging.     Thank you,

## 2023-01-11 PROBLEM — R36.1 HEMATOSPERMIA: Status: ACTIVE | Noted: 2019-01-09

## 2023-01-13 ENCOUNTER — TELEPHONE (OUTPATIENT)
Dept: FAMILY MEDICINE CLINIC | Facility: CLINIC | Age: 73
End: 2023-01-13

## 2023-01-13 NOTE — TELEPHONE ENCOUNTER
Patient calling, patient requesting referral to [facility] [place]. Patient has referral on file but states issues with Cardiology and medication have arised. Will like to see GI at [facility].  Please advise

## 2023-01-16 NOTE — TELEPHONE ENCOUNTER
Pt has Medicare Pt B as primary and [facility] PPO as secondary. Both have been re-entered. Pt will call back with fax number [address] as that is where he would like to do his colonoscopy (his cardiologist is there).

## 2023-01-19 ENCOUNTER — HOSPITAL ENCOUNTER (OUTPATIENT)
Dept: GENERAL RADIOLOGY | Age: 73
Discharge: HOME | End: 2023-01-19
Attending: ORTHOPAEDIC SURGERY
Payer: MEDICARE

## 2023-01-19 ENCOUNTER — OFFICE VISIT (OUTPATIENT)
Dept: ORTHOPEDICS CLINIC | Facility: CLINIC | Age: 73
End: 2023-01-19
Payer: MEDICARE

## 2023-01-19 VITALS — BODY MASS INDEX: 27.4 KG/M2 | HEIGHT: 69 IN | WEIGHT: 185 LBS

## 2023-01-19 DIAGNOSIS — M18.10 ARTHRITIS OF CARPOMETACARPAL (CMC) JOINT OF THUMB: Primary | ICD-10-CM

## 2023-01-19 DIAGNOSIS — M79.642 LEFT HAND PAIN: ICD-10-CM

## 2023-01-19 PROCEDURE — 99203 OFFICE O/P NEW LOW 30 MIN: CPT | Performed by: ORTHOPAEDIC SURGERY

## 2023-01-19 PROCEDURE — 1126F AMNT PAIN NOTED NONE PRSNT: CPT | Performed by: ORTHOPAEDIC SURGERY

## 2023-01-19 PROCEDURE — 73130 X-RAY EXAM OF HAND: CPT | Performed by: ORTHOPAEDIC SURGERY

## 2023-03-08 ENCOUNTER — TELEPHONE (OUTPATIENT)
Dept: ORTHOPEDICS CLINIC | Facility: CLINIC | Age: 73
End: 2023-03-08

## 2023-03-08 DIAGNOSIS — M25.521 RIGHT ELBOW PAIN: Primary | ICD-10-CM

## 2023-04-20 ENCOUNTER — OFFICE VISIT (OUTPATIENT)
Dept: ORTHOPEDICS CLINIC | Facility: CLINIC | Age: 73
End: 2023-04-20
Payer: MEDICARE

## 2023-04-20 ENCOUNTER — HOSPITAL ENCOUNTER (OUTPATIENT)
Dept: GENERAL RADIOLOGY | Age: 73
Discharge: HOME | End: 2023-04-20
Attending: ORTHOPAEDIC SURGERY
Payer: MEDICARE

## 2023-04-20 VITALS — HEIGHT: 69 IN | BODY MASS INDEX: 27.4 KG/M2 | WEIGHT: 185 LBS

## 2023-04-20 DIAGNOSIS — G56.31 RADIAL TUNNEL SYNDROME, RIGHT: Primary | ICD-10-CM

## 2023-04-20 DIAGNOSIS — M77.01 MEDIAL EPICONDYLITIS OF RIGHT ELBOW: ICD-10-CM

## 2023-04-20 DIAGNOSIS — M25.521 RIGHT ELBOW PAIN: ICD-10-CM

## 2023-04-20 PROCEDURE — 73080 X-RAY EXAM OF ELBOW: CPT | Performed by: ORTHOPAEDIC SURGERY

## 2023-04-20 PROCEDURE — 1125F AMNT PAIN NOTED PAIN PRSNT: CPT | Performed by: ORTHOPAEDIC SURGERY

## 2023-04-20 PROCEDURE — 99214 OFFICE O/P EST MOD 30 MIN: CPT | Performed by: ORTHOPAEDIC SURGERY

## 2023-05-02 ENCOUNTER — TELEPHONE (OUTPATIENT)
Dept: PHYSICAL THERAPY | Facility: HOSPITAL | Age: 73
End: 2023-05-02

## 2023-05-05 ENCOUNTER — OFFICE VISIT (OUTPATIENT)
Dept: OCCUPATIONAL MEDICINE | Age: 73
End: 2023-05-05
Attending: ORTHOPAEDIC SURGERY
Payer: MEDICARE

## 2023-05-05 DIAGNOSIS — M77.01 MEDIAL EPICONDYLITIS OF RIGHT ELBOW: ICD-10-CM

## 2023-05-05 DIAGNOSIS — G56.31 RADIAL TUNNEL SYNDROME, RIGHT: ICD-10-CM

## 2023-05-05 PROCEDURE — 97110 THERAPEUTIC EXERCISES: CPT

## 2023-05-05 PROCEDURE — 97165 OT EVAL LOW COMPLEX 30 MIN: CPT

## 2023-05-09 ENCOUNTER — OFFICE VISIT (OUTPATIENT)
Dept: OCCUPATIONAL MEDICINE | Age: 73
End: 2023-05-09
Attending: ORTHOPAEDIC SURGERY
Payer: MEDICARE

## 2023-05-09 PROCEDURE — 97140 MANUAL THERAPY 1/> REGIONS: CPT

## 2023-05-09 PROCEDURE — 97110 THERAPEUTIC EXERCISES: CPT

## 2023-05-09 NOTE — PROGRESS NOTES
Diagnosis:    Radial tunnel syndrome, right (G56.31)  Medial epicondylitis of right elbow (M77.01)    Referring Provider: Nuno Blanca  Date of Evaluation:    5/5/23    Precautions:  None Next MD visit:   none scheduled  Date of Surgery: n/a   Insurance Primary/Secondary: MEDICARE / Nicolasa Meraz PPO     # Auth Visits: NA           Subjective: \"I'm good. I think those exercises helped\"    Pain: 0/10 at time of treatment. Pain reported at 1-2/10 over the weekend. Objective: No pain at the medial epicondyle with palpation  Grittiness throughout wrist extensors      Assessment: Pain has improved since initial evaluation with performance of exercises and position awareness, particularly while sleeping. Pt tolerating light PREs without increase in pain in clinic. Goals: (to be met in 8 visits)  Pt will be independent and compliant with comprehensive HEP to maintain progress achieved in OT  Pt will report no pain while lifting a moderately sized grocery bag. Pt will increase tripod pinch to 16 lb for use while opening containers  Pt will return to lifting weights at the gym (upper body)    Plan: Continue OT for ROM, neural glides, progressive strengthening  Date: 5/9/2023  TX#: 2/8 Date:                 TX#: 3/ Date:                 TX#: 4/ Date:                 TX#: 5/ Date:    Tx#: 6/   IASTM throughout wrist extensors, gentle brushing over radial tunnel    STM, CFM to wrist flexors, medial epicondyle       Radial nerve glides  X 5  Ulnar nerve glides x 5       beige Flex bar  -wrist flexion (eccentric)  -wrist extension  -bar bend  -reverse bar bend  -supination  -pronation  -simulated door open  -simulated door close  Each x 20       digiflex blue full  x 20  Green each digit x 10       HEP: HEP upgrades in bold    Charges: 1 MT, 2 TE       Total Timed Treatment: 40 min  Total Treatment Time: 40 min

## 2023-05-16 ENCOUNTER — OFFICE VISIT (OUTPATIENT)
Dept: OCCUPATIONAL MEDICINE | Age: 73
End: 2023-05-16
Attending: ORTHOPAEDIC SURGERY
Payer: MEDICARE

## 2023-05-16 PROCEDURE — 97110 THERAPEUTIC EXERCISES: CPT

## 2023-05-16 PROCEDURE — 97140 MANUAL THERAPY 1/> REGIONS: CPT

## 2023-05-16 NOTE — PROGRESS NOTES
Diagnosis:    Radial tunnel syndrome, right (G56.31)  Medial epicondylitis of right elbow (M77.01)    Referring Provider: Radha Mohan  Date of Evaluation:    5/5/23    Precautions:  None Next MD visit:   none scheduled  Date of Surgery: n/a   Insurance Primary/Secondary: MEDICARE / [place] PPO     # Auth Visits: NA           Subjective: \"I can feel it on the back of the elbow some when I do the nerve glides. \"    Pain: 1/10 at time of treatment      Objective: No pain at the medial epicondyle with palpation, no pain throughout the wrist flexors  Grittiness with mild trigger points throughout wrist extensors      Assessment: Pain has improved since initial evaluation with performance of exercises and position awareness, particularly while sleeping. Pt is no longer experiencing medial elbow pain. Pt has returned to gym activities however has not been performing strenuous upper body exercises. Progressed PREs in clinic with theraband and added to HEP. Goals: (to be met in 8 visits)  Pt will be independent and compliant with comprehensive HEP to maintain progress achieved in OT  Pt will report no pain while lifting a moderately sized grocery bag. Pt will increase tripod pinch to 16 lb for use while opening containers  Pt will return to lifting weights at the gym (upper body)    Plan: Continue OT for ROM, neural glides, progressive strengthening  Date: 5/9/2023  TX#: 2/8 Date:   5/16/23              TX#: 3/8 Date:                 TX#: 4/ Date:                 TX#: 5/ Date:    Tx#: 6/   IASTM throughout wrist extensors, gentle brushing over radial tunnel    STM, CFM to wrist flexors, medial epicondyle IASTM throughout wrist extensors, gentle brushing over radial tunnel    STM, CFM to wrist flexors, medial epicondyle      Radial nerve glides  X 5  Ulnar nerve glides x 5 ROM to elbow, wrist  Gentle mobilization to improve alignment during ROM      beige Flex bar  -wrist flexion (eccentric)  -wrist extension  -bar bend  -reverse bar bend  -supination  -pronation  -simulated door open  -simulated door close  Each x 20 Yellow band exercises  -hammer curl  -triceps extension  -pronation  -supination  -wrist flexion  -wrist extension  -rows  Each x 20      digiflex blue full  x 20  Green each digit x 10 Scapular stabilization tasks       Gripper  Black level 3 x 30  Level 2 fingertip  x 30    Radial nerve glide  Brachial plexus glide      HEP: HEP upgrades in bold    Charges: 1 MT, 2 TE       Total Timed Treatment: 40 min  Total Treatment Time: 40 min

## 2023-05-19 ENCOUNTER — OFFICE VISIT (OUTPATIENT)
Dept: OCCUPATIONAL MEDICINE | Age: 73
End: 2023-05-19
Attending: ORTHOPAEDIC SURGERY
Payer: MEDICARE

## 2023-05-19 PROCEDURE — 97140 MANUAL THERAPY 1/> REGIONS: CPT

## 2023-05-19 PROCEDURE — 97110 THERAPEUTIC EXERCISES: CPT

## 2023-05-19 NOTE — PROGRESS NOTES
Diagnosis:    Radial tunnel syndrome, right (G56.31)  Medial epicondylitis of right elbow (M77.01)    Referring Provider: Suzi Dakins  Date of Evaluation:    5/5/23    Precautions:  None Next MD visit:   none scheduled  Date of Surgery: n/a   Insurance Primary/Secondary: MEDICARE / Rafael Saunders PPO     # Auth Visits: NA           Subjective: \"When I was doing the bicep curl there was crunching at the back of the elbow. Also a little pain with the resisted pronation. \"    Pain: 1/10 at time of treatment      Objective: Mild crepitus at posterior elbow during resisted elbow flexion        Assessment: Pain improved in both frequency of occurrence as well as intensity, though does continue to be present with palpation to radial tunnel and with resisted elbow flexion and resisted forearm pronation. Goals: (to be met in 8 visits)  Pt will be independent and compliant with comprehensive HEP to maintain progress achieved in OT  Pt will report no pain while lifting a moderately sized grocery bag. Pt will increase tripod pinch to 16 lb for use while opening containers  Pt will return to lifting weights at the gym (upper body)    Plan: Continue OT for ROM, neural glides, progressive strengthening  Date: 5/9/2023  TX#: 2/8 Date:   5/16/23              TX#: 3/8 Date:                 TX#: 4/ Date:                 TX#: 5/ Date:    Tx#: 6/   IASTM throughout wrist extensors, gentle brushing over radial tunnel    STM, CFM to wrist flexors, medial epicondyle IASTM throughout wrist extensors, gentle brushing over radial tunnel    STM, CFM to wrist flexors, medial epicondyle Upper cycle   5 min level 1 forward/reverse     Radial nerve glides  X 5  Ulnar nerve glides x 5 ROM to elbow, wrist  Gentle mobilization to improve alignment during ROM IASTM throughout wrist extensors, gentle brushing over radial tunnel in supine    STM, CFM to wrist flexors, medial epicondyle     beige Flex bar  -wrist flexion (eccentric)  -wrist extension  -bar bend  -reverse bar bend  -supination  -pronation  -simulated door open  -simulated door close  Each x 20 Yellow band exercises  -hammer curl  -triceps extension  -pronation  -supination  -wrist flexion  -wrist extension  -rows  Each x 20 Passive neural glides in supine  Wall weight bands light  -high row/scapular retraction  -triceps extension  -bicep curl  -shoulder extension with wrist stabilization  Each x 20     digiflex blue full  x 20  Green each digit x 10 Scapular stabilization tasks Elbow extension stretch with gentle varus pressure using theraband      Gripper  Black level 3 x 30  Level 2 fingertip  x 30    Radial nerve glide  Brachial plexus glide      HEP: HEP upgrades in bold    Charges: 2 MT, 1 TE      Total Timed Treatment: 40 min  Total Treatment Time: 40 min

## 2023-05-23 ENCOUNTER — OFFICE VISIT (OUTPATIENT)
Dept: OCCUPATIONAL MEDICINE | Age: 73
End: 2023-05-23
Attending: ORTHOPAEDIC SURGERY
Payer: MEDICARE

## 2023-05-23 ENCOUNTER — LAB ENCOUNTER (OUTPATIENT)
Dept: LAB | Age: 73
End: 2023-05-23
Attending: FAMILY MEDICINE
Payer: MEDICARE

## 2023-05-23 DIAGNOSIS — E53.8 VITAMIN B12 DEFICIENCY: ICD-10-CM

## 2023-05-23 DIAGNOSIS — Z00.00 ENCOUNTER FOR ANNUAL WELLNESS EXAM IN MEDICARE PATIENT: ICD-10-CM

## 2023-05-23 DIAGNOSIS — Z86.19 HISTORY OF HEPATITIS C: ICD-10-CM

## 2023-05-23 DIAGNOSIS — Z12.5 SCREENING FOR MALIGNANT NEOPLASM OF PROSTATE: ICD-10-CM

## 2023-05-23 DIAGNOSIS — E55.9 VITAMIN D DEFICIENCY: ICD-10-CM

## 2023-05-23 LAB
ALBUMIN SERPL-MCNC: 3.8 G/DL (ref 3.4–5)
ALBUMIN/GLOB SERPL: 1 {RATIO} (ref 1–2)
ALP LIVER SERPL-CCNC: 56 U/L
ALT SERPL-CCNC: 26 U/L
ANION GAP SERPL CALC-SCNC: 3 MMOL/L (ref 0–18)
AST SERPL-CCNC: 34 U/L (ref 15–37)
BASOPHILS # BLD AUTO: 0.02 X10(3) UL (ref 0–0.2)
BASOPHILS NFR BLD AUTO: 0.4 %
BILIRUB SERPL-MCNC: 0.7 MG/DL (ref 0.1–2)
BUN BLD-MCNC: 18 MG/DL (ref 7–18)
CALCIUM BLD-MCNC: 9 MG/DL (ref 8.5–10.1)
CHLORIDE SERPL-SCNC: 108 MMOL/L (ref 98–112)
CHOLEST SERPL-MCNC: 136 MG/DL (ref ?–200)
CO2 SERPL-SCNC: 30 MMOL/L (ref 21–32)
COMPLEXED PSA SERPL-MCNC: 1.51 NG/ML (ref ?–4)
CREAT BLD-MCNC: 0.74 MG/DL
EOSINOPHIL # BLD AUTO: 0.11 X10(3) UL (ref 0–0.7)
EOSINOPHIL NFR BLD AUTO: 1.9 %
ERYTHROCYTE [DISTWIDTH] IN BLOOD BY AUTOMATED COUNT: 12.9 %
FASTING PATIENT LIPID ANSWER: YES
FASTING STATUS PATIENT QL REPORTED: YES
GFR SERPLBLD BASED ON 1.73 SQ M-ARVRAT: 96 ML/MIN/1.73M2 (ref 60–?)
GLOBULIN PLAS-MCNC: 3.7 G/DL (ref 2.8–4.4)
GLUCOSE BLD-MCNC: 91 MG/DL (ref 70–99)
HCT VFR BLD AUTO: 42.6 %
HDLC SERPL-MCNC: 56 MG/DL (ref 40–59)
HGB BLD-MCNC: 13.9 G/DL
IMM GRANULOCYTES # BLD AUTO: 0.01 X10(3) UL (ref 0–1)
IMM GRANULOCYTES NFR BLD: 0.2 %
LDLC SERPL CALC-MCNC: 69 MG/DL (ref ?–100)
LYMPHOCYTES # BLD AUTO: 0.77 X10(3) UL (ref 1–4)
LYMPHOCYTES NFR BLD AUTO: 13.6 %
MCH RBC QN AUTO: 31.8 PG (ref 26–34)
MCHC RBC AUTO-ENTMCNC: 32.6 G/DL (ref 31–37)
MCV RBC AUTO: 97.5 FL
MONOCYTES # BLD AUTO: 0.46 X10(3) UL (ref 0.1–1)
MONOCYTES NFR BLD AUTO: 8.1 %
NEUTROPHILS # BLD AUTO: 4.29 X10 (3) UL (ref 1.5–7.7)
NEUTROPHILS # BLD AUTO: 4.29 X10(3) UL (ref 1.5–7.7)
NEUTROPHILS NFR BLD AUTO: 75.8 %
NONHDLC SERPL-MCNC: 80 MG/DL (ref ?–130)
OSMOLALITY SERPL CALC.SUM OF ELEC: 293 MOSM/KG (ref 275–295)
PLATELET # BLD AUTO: 178 10(3)UL (ref 150–450)
POTASSIUM SERPL-SCNC: 4 MMOL/L (ref 3.5–5.1)
PROT SERPL-MCNC: 7.5 G/DL (ref 6.4–8.2)
RBC # BLD AUTO: 4.37 X10(6)UL
SODIUM SERPL-SCNC: 141 MMOL/L (ref 136–145)
T4 FREE SERPL-MCNC: 0.8 NG/DL (ref 0.8–1.7)
TRIGL SERPL-MCNC: 47 MG/DL (ref 30–149)
TSI SER-ACNC: 1.99 MIU/ML (ref 0.36–3.74)
VIT B12 SERPL-MCNC: 394 PG/ML (ref 193–986)
VIT D+METAB SERPL-MCNC: 45.8 NG/ML (ref 30–100)
VLDLC SERPL CALC-MCNC: 7 MG/DL (ref 0–30)
WBC # BLD AUTO: 5.7 X10(3) UL (ref 4–11)

## 2023-05-23 PROCEDURE — 80053 COMPREHEN METABOLIC PANEL: CPT

## 2023-05-23 PROCEDURE — 80061 LIPID PANEL: CPT

## 2023-05-23 PROCEDURE — 84439 ASSAY OF FREE THYROXINE: CPT

## 2023-05-23 PROCEDURE — 36415 COLL VENOUS BLD VENIPUNCTURE: CPT

## 2023-05-23 PROCEDURE — 97140 MANUAL THERAPY 1/> REGIONS: CPT

## 2023-05-23 PROCEDURE — 85025 COMPLETE CBC W/AUTO DIFF WBC: CPT

## 2023-05-23 PROCEDURE — 82306 VITAMIN D 25 HYDROXY: CPT

## 2023-05-23 PROCEDURE — 84443 ASSAY THYROID STIM HORMONE: CPT

## 2023-05-23 PROCEDURE — 97110 THERAPEUTIC EXERCISES: CPT

## 2023-05-23 PROCEDURE — 82607 VITAMIN B-12: CPT

## 2023-05-23 PROCEDURE — 87522 HEPATITIS C REVRS TRNSCRPJ: CPT

## 2023-05-23 NOTE — PROGRESS NOTES
Diagnosis:    Radial tunnel syndrome, right (G56.31)  Medial epicondylitis of right elbow (M77.01)    Referring Provider: Odessia Burkitt  Date of Evaluation:    5/5/23    Precautions:  None Next MD visit:   none scheduled  Date of Surgery: n/a   Insurance Primary/Secondary: MEDICARE / Judy Hernandez PPO     # Auth Visits: NA           Subjective: \"Still rated at a 1-2, but the pain is happening less often. \"  Pt indicates he is performing only light gym exercises. Pain: 1-2/10 at time of treatment      Objective: Crepitus at cubital tunnel (mild) with performance of ulnar nerve glides  Tripod pinch=18 lb        Assessment: Pain improved in both frequency of occurrence as well as intensity and pt has been able to incorporate the UE into additional activities and gym exercises. Tripod pinch has improved to be WNL. Upraded activities in clinic which were tolerated well without increase in pain. Goals: (to be met in 8 visits)  Pt will be independent and compliant with comprehensive HEP to maintain progress achieved in OT  Pt will report no pain while lifting a moderately sized grocery bag. Pt will increase tripod pinch to 16 lb for use while opening containers: met 5/23/23  Pt will return to lifting weights at the gym (upper body)    Plan: Continue OT for ROM, neural glides, progressive strengthening  Date: 5/9/2023  TX#: 2/8 Date:   5/16/23              TX#: 3/8 Date:                 TX#: 4/ Date:   5/23/23              TX#: 5/8 Date:    Tx#: 6/   IASTM throughout wrist extensors, gentle brushing over radial tunnel    STM, CFM to wrist flexors, medial epicondyle IASTM throughout wrist extensors, gentle brushing over radial tunnel    STM, CFM to wrist flexors, medial epicondyle Upper cycle   5 min level 1 forward/reverse IASTM throughout wrist extensors, gentle brushing over radial tunnel in supine    STM, CFM to wrist flexors, medial epicondyle    Radial nerve glides  X 5  Ulnar nerve glides x 5 ROM to elbow, wrist  Gentle mobilization to improve alignment during ROM IASTM throughout wrist extensors, gentle brushing over radial tunnel in supine    STM, CFM to wrist flexors, medial epicondyle Upper cycle   5 min level 2 forward/reverse    beige Flex bar  -wrist flexion (eccentric)  -wrist extension  -bar bend  -reverse bar bend  -supination  -pronation  -simulated door open  -simulated door close  Each x 20 Yellow band exercises  -hammer curl  -triceps extension  -pronation  -supination  -wrist flexion  -wrist extension  -rows  Each x 20 Passive neural glides in supine  Wall weight bands light  -high row/scapular retraction  -triceps extension  -bicep curl  -shoulder extension with wrist stabilization  Each x 20 2 lb ball pass x 20  Pass around 10/10  rebounder x 30 (overhand)  Scapular stabilization on the wall    digiflex blue full  x 20  Green each digit x 10 Scapular stabilization tasks Elbow extension stretch with gentle varus pressure using theraband Isometric   Wrist flexion  Wrist extension  Pronation  Supination  With yellow flex bar x 20 each  recipricol inhibition x 30 sec x 2     Gripper  Black level 3 x 30  Level 2 fingertip  x 30    Radial nerve glide  Brachial plexus glide  Passive ulnar nerve glides    HEP: HEP upgrades in bold    Charges:1 MT, 2 TE   Total Timed Treatment: 40 min  Total Treatment Time: 40 min

## 2023-05-26 ENCOUNTER — OFFICE VISIT (OUTPATIENT)
Dept: OCCUPATIONAL MEDICINE | Age: 73
End: 2023-05-26
Attending: ORTHOPAEDIC SURGERY
Payer: MEDICARE

## 2023-05-26 PROCEDURE — 97140 MANUAL THERAPY 1/> REGIONS: CPT

## 2023-05-26 PROCEDURE — 97110 THERAPEUTIC EXERCISES: CPT

## 2023-05-26 NOTE — PROGRESS NOTES
Diagnosis:    Radial tunnel syndrome, right (G56.31)  Medial epicondylitis of right elbow (M77.01)    Referring Provider: Esdras Haney  Date of Evaluation:    5/5/23    Precautions:  None Next MD visit:   none scheduled  Date of Surgery: n/a   Insurance Primary/Secondary: MEDICARE / Stephanie Barber PPO     # Auth Visits: NA           Subjective: \"There's pain very once and a while, even less than last time. \"    Pain: 0/10 at time of treatment      Objective: Trigger points noted throughout ECRL/B        Assessment: Pt demo forward posture with tight chest muscles and weak scapular stabilizers. Upgraded scapular strengthening and added pectoral muscle stretch as able. Pt tolerated all weight exercises in clinic with no increase in pain. Goals: (to be met in 8 visits)  Pt will be independent and compliant with comprehensive HEP to maintain progress achieved in OT  Pt will report no pain while lifting a moderately sized grocery bag.   Pt will increase tripod pinch to 16 lb for use while opening containers: met 5/23/23  Pt will return to lifting weights at the gym (upper body)    Plan: Continue OT for ROM, neural glides, progressive strengthening  Date: 5/9/2023  TX#: 2/8 Date:   5/16/23              TX#: 3/8 Date:                 TX#: 4/ Date:   5/23/23              TX#: 5/8 Date: 5/26/23  Tx#: 6/8   IASTM throughout wrist extensors, gentle brushing over radial tunnel    STM, CFM to wrist flexors, medial epicondyle IASTM throughout wrist extensors, gentle brushing over radial tunnel    STM, CFM to wrist flexors, medial epicondyle Upper cycle   5 min level 1 forward/reverse IASTM throughout wrist extensors, gentle brushing over radial tunnel in supine    STM, CFM to wrist flexors, medial epicondyle STM throughout wrist extensors/flexion  Trigger point   Radial nerve glides  X 5  Ulnar nerve glides x 5 ROM to elbow, wrist  Gentle mobilization to improve alignment during ROM IASTM throughout wrist extensors, gentle brushing over radial tunnel in supine    STM, CFM to wrist flexors, medial epicondyle Upper cycle   5 min level 2 forward/reverse Self stretch/ROM   beige Flex bar  -wrist flexion (eccentric)  -wrist extension  -bar bend  -reverse bar bend  -supination  -pronation  -simulated door open  -simulated door close  Each x 20 Yellow band exercises  -hammer curl  -triceps extension  -pronation  -supination  -wrist flexion  -wrist extension  -rows  Each x 20 Passive neural glides in supine  Wall weight bands light  -high row/scapular retraction  -triceps extension  -bicep curl  -shoulder extension with wrist stabilization  Each x 20 2 lb ball pass x 20  Pass around 10/10  rebounder x 30 (overhand)  Scapular stabilization on the wall yellow Flex bar  -wrist flexion  -wrist extension  -bar bend  -reverse bar bend  -supination (sim door open)  -pronation (sim door close)  Each x 20       Wall weights  High row 20 lb  Low row (pronated) 30 lb  Bicep curl 15 lb  Triceps extension 20 lb  Each x 20   digiflex blue full  x 20  Green each digit x 10 Scapular stabilization tasks Elbow extension stretch with gentle varus pressure using theraband Isometric   Wrist flexion  Wrist extension  Pronation  Supination  With yellow flex bar x 20 each  recipricol inhibition x 30 sec x 2 Gripper black level 4 x 25  Level 3 fingertip  x 20    Gripper  Black level 3 x 30  Level 2 fingertip  x 30    Radial nerve glide  Brachial plexus glide  Passive ulnar nerve glides Scapular stabilization on wall and over foam roller  Pectoralis stretch   HEP: HEP upgrades in bold    Charges:1 MT, 2 TE   Total Timed Treatment: 40 min  Total Treatment Time: 40 min

## 2023-05-30 ENCOUNTER — OFFICE VISIT (OUTPATIENT)
Dept: OCCUPATIONAL MEDICINE | Age: 73
End: 2023-05-30
Attending: ORTHOPAEDIC SURGERY
Payer: MEDICARE

## 2023-05-30 PROCEDURE — 97140 MANUAL THERAPY 1/> REGIONS: CPT

## 2023-05-30 PROCEDURE — 97110 THERAPEUTIC EXERCISES: CPT

## 2023-05-30 NOTE — PROGRESS NOTES
Diagnosis:    Radial tunnel syndrome, right (G56.31)  Medial epicondylitis of right elbow (M77.01)    Referring Provider: Cornel Persaud  Date of Evaluation:    5/5/23    Precautions:  None Next MD visit:   none scheduled  Date of Surgery: n/a   Insurance Primary/Secondary: Ry Shah / [email or web address] [facility] PPO     # Auth Visits: NA           Subjective: \"I noticed on Saturday I was moving an end table and I was outstretched and there was discomfort. It wasn't necessarily painful, just uncomfortable. \"    Pain: 0/10 at time of treatment      Objective: Trigger points noted throughout ECRL/B        Assessment: Upgraded HEP to include more resistive theraband exercises which were tolerated well in clinic. Pain well managed throughout all exercises in clinic. Pt has been increasing performance with weights at home and in the gym, though has not fully returned to prior level of activity. Goals: (to be met in 8 visits)  Pt will be independent and compliant with comprehensive HEP to maintain progress achieved in OT  Pt will report no pain while lifting a moderately sized grocery bag. Pt will increase tripod pinch to 16 lb for use while opening containers: met 5/23/23  Pt will return to lifting weights at the gym (upper body)    Plan: Continue OT for ROM, neural glides, progressive strengthening.  Measurements for progress note  Date: 5/9/2023  TX#: 2/8 Date:   5/16/23              TX#: 3/8 Date:                 TX#: 4/ Date:   5/23/23              TX#: 5/8 Date: 5/26/23  Tx#: 6/8 5/30/23  Tx: 7/8   IASTM throughout wrist extensors, gentle brushing over radial tunnel    STM, CFM to wrist flexors, medial epicondyle IASTM throughout wrist extensors, gentle brushing over radial tunnel    STM, CFM to wrist flexors, medial epicondyle Upper cycle   5 min level 1 forward/reverse IASTM throughout wrist extensors, gentle brushing over radial tunnel in supine    STM, CFM to wrist flexors, medial epicondyle STM throughout wrist extensors/flexion  Trigger point STM throughout wrist extensors/flexion  Trigger point   Radial nerve glides  X 5  Ulnar nerve glides x 5 ROM to elbow, wrist  Gentle mobilization to improve alignment during ROM IASTM throughout wrist extensors, gentle brushing over radial tunnel in supine    STM, CFM to wrist flexors, medial epicondyle Upper cycle   5 min level 2 forward/reverse Self stretch/ROM Self stretch/ROM   beige Flex bar  -wrist flexion (eccentric)  -wrist extension  -bar bend  -reverse bar bend  -supination  -pronation  -simulated door open  -simulated door close  Each x 20 Yellow band exercises  -hammer curl  -triceps extension  -pronation  -supination  -wrist flexion  -wrist extension  -rows  Each x 20 Passive neural glides in supine  Wall weight bands light  -high row/scapular retraction  -triceps extension  -bicep curl  -shoulder extension with wrist stabilization  Each x 20 2 lb ball pass x 20  Pass around 10/10  rebounder x 30 (overhand)  Scapular stabilization on the wall yellow Flex bar  -wrist flexion  -wrist extension  -bar bend  -reverse bar bend  -supination (sim door open)  -pronation (sim door close)  Each x 20 Green theraband  -bicep curl  -triceps extension  -pronation  -supination  -wrist flexion  -wrist extension  -horizontal abd  -shoulder flexion  -scapular retraction  Each x 20       Wall weights  High row 20 lb  Low row (pronated) 30 lb  Bicep curl 15 lb  Triceps extension 20 lb  Each x 20 2 lb weighted ball  -wrist alphabet  -catch x 30  -quick catch x 30  -wrist circles   digiflex blue full  x 20  Green each digit x 10 Scapular stabilization tasks Elbow extension stretch with gentle varus pressure using theraband Isometric   Wrist flexion  Wrist extension  Pronation  Supination  With yellow flex bar x 20 each  recipricol inhibition x 30 sec x 2 Gripper black level 4 x 25  Level 3 fingertip  x 20 Therapy paw green full  x 30  /pull x 20    Gripper  Black level 3 x 30  Level 2 fingertip  x 30    Radial nerve glide  Brachial plexus glide  Passive ulnar nerve glides Scapular stabilization on wall and over foam roller  Pectoralis stretch    HEP: HEP upgrades in bold    Charges:1 MT, 2 TE   Total Timed Treatment: 40 min  Total Treatment Time: 40 min

## 2023-06-02 ENCOUNTER — OFFICE VISIT (OUTPATIENT)
Dept: OCCUPATIONAL MEDICINE | Age: 73
End: 2023-06-02
Attending: ORTHOPAEDIC SURGERY
Payer: MEDICARE

## 2023-06-02 PROCEDURE — 97140 MANUAL THERAPY 1/> REGIONS: CPT

## 2023-06-02 PROCEDURE — 97110 THERAPEUTIC EXERCISES: CPT

## 2023-06-02 PROCEDURE — 97530 THERAPEUTIC ACTIVITIES: CPT

## 2023-06-02 NOTE — PROGRESS NOTES
Diagnosis:    Radial tunnel syndrome, right (G56.31)  Medial epicondylitis of right elbow (M77.01)    Referring Provider: Susana Tang  Date of Evaluation:    5/5/23    Precautions:  None Next MD visit:   none scheduled  Date of Surgery: n/a   Insurance Primary/Secondary: Kerri Barry / [employer] PPO     # Auth Visits: NA            Progress Summary  Pt has attended 8 visits in Occupational Therapy. Subjective:\"I can still feel it at the front of the arm. I know it's not quite right. \"    Pain: 1-2/10 at time of treatment      Objective: Trigger points noted throughout ECRL/B     R=58.6   (stress position) R=60 with 0/10 pain L=61 with 0/10 pain  Tripod pinch=16      Assessment: Pt continues to note pain particularly in ECRL/B, though pain does not increase with any particular activity. Pt does note an improvement in both frequency of occurrence as well as intensity of pain though \"does not feel normal.\" Pt will benefit from continued therapy 1x/wk x 4 weeks to continue pain management and strengthening. Goals: (to be met in 8 visits)  Pt will be independent and compliant with comprehensive HEP to maintain progress achieved in OT  Pt will report no pain while lifting a moderately sized grocery bag: progressing  Pt will increase tripod pinch to 16 lb for use while opening containers: met 5/23/23  Pt will return to lifting weights at the gym (upper body): progressing    Plan: Continue OT 1x/wk x 4 weeks for continued pain management, strengthening        Patient/Family/Caregiver was advised of these findings, precautions, and treatment options and has agreed to actively participate in planning and for this course of care. Thank you for your referral. If you have any questions, please contact me at Dept: [phone].     Sincerely,  Electronically signed by therapist: SAMREEN Candelario/L    Physician's certification required:  No  Please co-sign or sign and return this letter via fax as soon as possible to [phone]. I certify the need for these services furnished under this plan of treatment and while under my care.     X___________________________________________________ Date____________________    Certification From: 7/2/3097  To:8/31/2023   Date: 5/9/2023  TX#: 2/8 Date:   5/16/23              TX#: 3/8 Date:                 TX#: 4/ Date:   5/23/23              TX#: 5/8 Date: 5/26/23  Tx#: 6/8 5/30/23  Tx: 7/8 6/1/23  Tx: 8/8   IASTM throughout wrist extensors, gentle brushing over radial tunnel    STM, CFM to wrist flexors, medial epicondyle IASTM throughout wrist extensors, gentle brushing over radial tunnel    STM, CFM to wrist flexors, medial epicondyle Upper cycle   5 min level 1 forward/reverse IASTM throughout wrist extensors, gentle brushing over radial tunnel in supine    STM, CFM to wrist flexors, medial epicondyle STM throughout wrist extensors/flexion  Trigger point STM throughout wrist extensors/flexion  Trigger point IASTM throughout wrist extensors, gentle brushing over radial tunnel, strumming to trigger points throughout wrist extensor wad   Radial nerve glides  X 5  Ulnar nerve glides x 5 ROM to elbow, wrist  Gentle mobilization to improve alignment during ROM IASTM throughout wrist extensors, gentle brushing over radial tunnel in supine    STM, CFM to wrist flexors, medial epicondyle Upper cycle   5 min level 2 forward/reverse Self stretch/ROM Self stretch/ROM ROM to wrist and elbow   beige Flex bar  -wrist flexion (eccentric)  -wrist extension  -bar bend  -reverse bar bend  -supination  -pronation  -simulated door open  -simulated door close  Each x 20 Yellow band exercises  -hammer curl  -triceps extension  -pronation  -supination  -wrist flexion  -wrist extension  -rows  Each x 20 Passive neural glides in supine  Wall weight bands light  -high row/scapular retraction  -triceps extension  -bicep curl  -shoulder extension with wrist stabilization  Each x 20 2 lb ball pass x 20  Pass around 10/10  rebounder x 30 (overhand)  Scapular stabilization on the wall yellow Flex bar  -wrist flexion  -wrist extension  -bar bend  -reverse bar bend  -supination (sim door open)  -pronation (sim door close)  Each x 20 Green theraband  -bicep curl  -triceps extension  -pronation  -supination  -wrist flexion  -wrist extension  -horizontal abd  -shoulder flexion  -scapular retraction  Each x 20 Kinesiotape  Fascia correction, inhibition to wrist extensors       Wall weights  High row 20 lb  Low row (pronated) 30 lb  Bicep curl 15 lb  Triceps extension 20 lb  Each x 20 2 lb weighted ball  -wrist alphabet  -catch x 30  -quick catch x 30  -wrist circles Objective measurements   digiflex blue full  x 20  Green each digit x 10 Scapular stabilization tasks Elbow extension stretch with gentle varus pressure using theraband Isometric   Wrist flexion  Wrist extension  Pronation  Supination  With yellow flex bar x 20 each  recipricol inhibition x 30 sec x 2 Gripper black level 4 x 25  Level 3 fingertip  x 20 Therapy paw green full  x 30  /pull x 20 Ergonomic education    Gripper  Black level 3 x 30  Level 2 fingertip  x 30    Radial nerve glide  Brachial plexus glide  Passive ulnar nerve glides Scapular stabilization on wall and over foam roller  Pectoralis stretch     HEP: HEP upgrades in bold    Charges:1 MT, 1 TE, 1 TA Total Timed Treatment: 40 min  Total Treatment Time: 40 min

## 2023-06-06 ENCOUNTER — OFFICE VISIT (OUTPATIENT)
Dept: OCCUPATIONAL MEDICINE | Age: 73
End: 2023-06-06
Attending: ORTHOPAEDIC SURGERY
Payer: MEDICARE

## 2023-06-06 PROCEDURE — 97140 MANUAL THERAPY 1/> REGIONS: CPT

## 2023-06-06 PROCEDURE — 97110 THERAPEUTIC EXERCISES: CPT

## 2023-06-06 NOTE — PROGRESS NOTES
Diagnosis:    Radial tunnel syndrome, right (G56.31)  Medial epicondylitis of right elbow (M77.01)    Referring Provider: Shante Marks  Date of Evaluation:    5/5/23    Precautions:  None Next MD visit:   none scheduled  Date of Surgery: n/a   Insurance Primary/Secondary: Kae Nava / Abel Rene PPO     # Auth Visits: NA            Progress Summary  Pt has attended 9 visits in Occupational Therapy. Subjective:\"I'm feeling really good. The tape really helped. During the day I had to  a box and I only felt a little bit on the inside. \"    Pain: 0/10 at time of treatment      Objective: Trigger points noted throughout ECRL/B, though less grittiness noted throughout the muscle wad, less pain with performance of trigger point massage     R=58.6   (stress position) R=60 with 0/10 pain L=61 with 0/10 pain  Tripod pinch=16      Assessment: Pain improved following use of Kinesiotape and pt able to perform exercises at home and in clinic with no increase in pain. Minor skin irritation noted in places, therefore when tape reapplied care used to avoid irritated areas and pt instructed in precautions if further irritation occurs. Goals: (to be met in 8 visits)  Pt will be independent and compliant with comprehensive HEP to maintain progress achieved in OT  Pt will report no pain while lifting a moderately sized grocery bag: progressing  Pt will increase tripod pinch to 16 lb for use while opening containers: met 5/23/23  Pt will return to lifting weights at the gym (upper body): progressing    Plan: Continue OT 1x/wk x 4 weeks for continued pain management, strengthening        Patient/Family/Caregiver was advised of these findings, precautions, and treatment options and has agreed to actively participate in planning and for this course of care. Thank you for your referral. If you have any questions, please contact me at Dept: [phone].     Sincerely,  Electronically signed by therapist: María Carroll OTR/L    Physician's certification required:  No  Please co-sign or sign and return this letter via fax as soon as possible to [phone]. I certify the need for these services furnished under this plan of treatment and while under my care.     X___________________________________________________ Date____________________    Certification From: 3/7/5256  To:8/31/2023   Date:                 TX#: 4/ Date:   5/23/23              TX#: 5/8 Date: 5/26/23  Tx#: 6/8 5/30/23  Tx: 7/8 6/1/23  Tx: 8/8 6/6/23  Tx: 9   Upper cycle   5 min level 1 forward/reverse IASTM throughout wrist extensors, gentle brushing over radial tunnel in supine    STM, CFM to wrist flexors, medial epicondyle STM throughout wrist extensors/flexion  Trigger point STM throughout wrist extensors/flexion  Trigger point IASTM throughout wrist extensors, gentle brushing over radial tunnel, strumming to trigger points throughout wrist extensor wad IASTM throughout wrist extensors, gentle brushing over radial tunnel, strumming to trigger points throughout wrist extensor wad   IASTM throughout wrist extensors, gentle brushing over radial tunnel in supine    STM, CFM to wrist flexors, medial epicondyle Upper cycle   5 min level 2 forward/reverse Self stretch/ROM Self stretch/ROM ROM to wrist and elbow Wrist circles    ROM to wrist and elbow   Passive neural glides in supine  Wall weight bands light  -high row/scapular retraction  -triceps extension  -bicep curl  -shoulder extension with wrist stabilization  Each x 20 2 lb ball pass x 20  Pass around 10/10  rebounder x 30 (overhand)  Scapular stabilization on the wall yellow Flex bar  -wrist flexion  -wrist extension  -bar bend  -reverse bar bend  -supination (sim door open)  -pronation (sim door close)  Each x 20 Green theraband  -bicep curl  -triceps extension  -pronation  -supination  -wrist flexion  -wrist extension  -horizontal abd  -shoulder flexion  -scapular retraction  Each x 20 Kinesiotape  Fascia correction, inhibition to wrist extensors beige Flex bar  Eccentric  -wrist flexion  -wrist extension  -bar bend  -reverse bar bend  -supination  -pronation  Each x 20     Wall weights  High row 20 lb  Low row (pronated) 30 lb  Bicep curl 15 lb  Triceps extension 20 lb  Each x 20 2 lb weighted ball  -wrist alphabet  -catch x 30  -quick catch x 30  -wrist circles Objective measurements Kinesiotape  Fascia correction, inhibition to wrist extensors  Fascial correction at medial epicondyle and wrist flexor wad   Elbow extension stretch with gentle varus pressure using theraband Isometric   Wrist flexion  Wrist extension  Pronation  Supination  With yellow flex bar x 20 each  recipricol inhibition x 30 sec x 2 Gripper black level 4 x 25  Level 3 fingertip  x 20 Therapy paw green full  x 30  /pull x 20 Ergonomic education     Passive ulnar nerve glides Scapular stabilization on wall and over foam roller  Pectoralis stretch      HEP: HEP upgrades in bold    Charges:2 MT, 1 TE Total Timed Treatment: 40 min  Total Treatment Time: 40 min

## 2023-06-15 ENCOUNTER — OFFICE VISIT (OUTPATIENT)
Dept: OCCUPATIONAL MEDICINE | Age: 73
End: 2023-06-15
Attending: ORTHOPAEDIC SURGERY
Payer: MEDICARE

## 2023-06-15 PROCEDURE — 97140 MANUAL THERAPY 1/> REGIONS: CPT

## 2023-06-15 PROCEDURE — 97110 THERAPEUTIC EXERCISES: CPT

## 2023-06-15 NOTE — PROGRESS NOTES
Diagnosis:    Radial tunnel syndrome, right (G56.31)  Medial epicondylitis of right elbow (M77.01)    Referring Provider: Cady Alcantar  Date of Evaluation:    5/5/23    Precautions:  None Next MD visit:   none scheduled  Date of Surgery: n/a   Insurance Primary/Secondary: Yunior Daley / [employer] PPO     # Auth Visits: NA            Progress Summary  Pt has attended 9 visits in Occupational Therapy. Subjective:\"I've been pretty good. There's a little pain on the inside of the elbow still. I had to lift some things and I could feel it. \"    Pain: 1/10 at time of treatment      Objective: Trigger points noted throughout ECRL/B, though less grittiness noted throughout the muscle wad, less pain with performance of trigger point massage     R=58.6   (stress position) R=60 with 0/10 pain L=61 with 0/10 pain  Tripod pinch=16      Assessment: PREs well tolerated with little increase in pain overall. When present, pain primarily localized to medial epicondyle and into the wrist flexors and primarily with activities which require gripping combined with flexion of the wrists. Goals: (to be met in 8 visits)  Pt will be independent and compliant with comprehensive HEP to maintain progress achieved in OT  Pt will report no pain while lifting a moderately sized grocery bag: progressing  Pt will increase tripod pinch to 16 lb for use while opening containers: met 5/23/23  Pt will return to lifting weights at the gym (upper body): progressing    Plan: Continue OT 1x/wk x 4 weeks for continued pain management, strengthening        Patient/Family/Caregiver was advised of these findings, precautions, and treatment options and has agreed to actively participate in planning and for this course of care. Thank you for your referral. If you have any questions, please contact me at Dept: [phone].     Sincerely,  Electronically signed by therapist: SAMREEN Flor/RAJAN    Physician's certification required:  No  Please co-sign or sign and return this letter via fax as soon as possible to [phone]. I certify the need for these services furnished under this plan of treatment and while under my care.     X___________________________________________________ Date____________________    Certification From: 4/2/0397  To:8/31/2023   Date:                 TX#: 4/ Date:   5/23/23              TX#: 5/8 Date: 5/26/23  Tx#: 6/8 5/30/23  Tx: 7/8 6/1/23  Tx: 8/8 6/6/23  Tx: 9 6/15/23  Tx: 10   Upper cycle   5 min level 1 forward/reverse IASTM throughout wrist extensors, gentle brushing over radial tunnel in supine    STM, CFM to wrist flexors, medial epicondyle STM throughout wrist extensors/flexion  Trigger point STM throughout wrist extensors/flexion  Trigger point IASTM throughout wrist extensors, gentle brushing over radial tunnel, strumming to trigger points throughout wrist extensor wad IASTM throughout wrist extensors, gentle brushing over radial tunnel, strumming to trigger points throughout wrist extensor wad IASTM throughout wrist extensors, gentle brushing over radial tunnel, strumming to trigger points throughout wrist extensor wad   IASTM throughout wrist extensors, gentle brushing over radial tunnel in supine    STM, CFM to wrist flexors, medial epicondyle Upper cycle   5 min level 2 forward/reverse Self stretch/ROM Self stretch/ROM ROM to wrist and elbow Wrist circles    ROM to wrist and elbow ROM to wrist and forearm in supine, shoulder ROM   Passive neural glides in supine  Wall weight bands light  -high row/scapular retraction  -triceps extension  -bicep curl  -shoulder extension with wrist stabilization  Each x 20 2 lb ball pass x 20  Pass around 10/10  rebounder x 30 (overhand)  Scapular stabilization on the wall yellow Flex bar  -wrist flexion  -wrist extension  -bar bend  -reverse bar bend  -supination (sim door open)  -pronation (sim door close)  Each x 20 Green theraband  -bicep curl  -triceps extension  -pronation  -supination  -wrist flexion  -wrist extension  -horizontal abd  -shoulder flexion  -scapular retraction  Each x 20 Kinesiotape  Fascia correction, inhibition to wrist extensors beige Flex bar  Eccentric  -wrist flexion  -wrist extension  -bar bend  -reverse bar bend  -supination  -pronation  Each x 20 Wrist roller with 1 lb weight 2x5     Wall weights  High row 20 lb  Low row (pronated) 30 lb  Bicep curl 15 lb  Triceps extension 20 lb  Each x 20 2 lb weighted ball  -wrist alphabet  -catch x 30  -quick catch x 30  -wrist circles Objective measurements Kinesiotape  Fascia correction, inhibition to wrist extensors  Fascial correction at medial epicondyle and wrist flexor wad TRX  Scapular stabilization, overhead stretch x 10  Pull up position x 10  Push up position x 10   Elbow extension stretch with gentle varus pressure using theraband Isometric   Wrist flexion  Wrist extension  Pronation  Supination  With yellow flex bar x 20 each  recipricol inhibition x 30 sec x 2 Gripper black level 4 x 25  Level 3 fingertip  x 20 Therapy paw green full  x 30  /pull x 20 Ergonomic education   combined with elbow flexion/extension with yellow flex bar x 20    Full  on blue power web x 20    Supination x 20  Pronation x 20    Passive ulnar nerve glides Scapular stabilization on wall and over foam roller  Pectoralis stretch       HEP: HEP upgrades in bold    Charges:1 MT, 2 TE Total Timed Treatment: 40 min  Total Treatment Time: 40 min

## 2023-06-20 ENCOUNTER — OFFICE VISIT (OUTPATIENT)
Dept: OCCUPATIONAL MEDICINE | Age: 73
End: 2023-06-20
Attending: ORTHOPAEDIC SURGERY
Payer: MEDICARE

## 2023-06-20 PROCEDURE — 97140 MANUAL THERAPY 1/> REGIONS: CPT

## 2023-06-20 PROCEDURE — 97110 THERAPEUTIC EXERCISES: CPT

## 2023-06-20 NOTE — PROGRESS NOTES
Diagnosis:    Radial tunnel syndrome, right (G56.31)  Medial epicondylitis of right elbow (M77.01)    Referring Provider: Cirilo Philippe  Date of Evaluation:    5/5/23    Precautions:  None Next MD visit:   none scheduled  Date of Surgery: n/a   Insurance Primary/Secondary: Danuta Frausto / Valorie Infante PPO     # Auth Visits: NA            Progress Summary  Pt has attended 11 visits in Occupational Therapy. Subjective:\"Sunday I was lifting some cases of water and I felt it just a little bit and today I was reaching up to a locker and I felt it, but otherwise it's been good. \"    Pain: 1/10 at time of treatment      Objective: Trigger points noted throughout ECRL/B. No pain noted with direct palpation to media epicondyle        Assessment: PREs well tolerated with little increase in pain overall and pt has increased overall use of arm at home during ADL and weighted activities. When present, pain primarily localized to medial epicondyle and into the wrist flexors and primarily with activities which require gripping combined with flexion of the wrists (such as lifting a case of water). Pt did note pain briefly in the wrist extensor wad while reaching into a locker, however pain was fleeting. Goals: (to be met in 8 visits)  Pt will be independent and compliant with comprehensive HEP to maintain progress achieved in OT  Pt will report no pain while lifting a moderately sized grocery bag: progressing  Pt will increase tripod pinch to 16 lb for use while opening containers: met 5/23/23  Pt will return to lifting weights at the gym (upper body): partially met, not full strength    Plan: Continue OT 1x/wk x 4 weeks for continued pain management, strengthening        Patient/Family/Caregiver was advised of these findings, precautions, and treatment options and has agreed to actively participate in planning and for this course of care.     Thank you for your referral. If you have any questions, please contact me at Dept: [phone]. Sincerely,  Electronically signed by therapist: SAMREEN Zambrano/L    Physician's certification required:  No  Please co-sign or sign and return this letter via fax as soon as possible to [phone]. I certify the need for these services furnished under this plan of treatment and while under my care.     X___________________________________________________ Date____________________    Certification From: 8/8/8537  To:8/31/2023   Date: 5/26/23  Tx#: 6/8 5/30/23  Tx: 7/8 6/1/23  Tx: 8/8 6/6/23  Tx: 9 6/15/23  Tx: 10 6/20/23  Tx: 11   STM throughout wrist extensors/flexion  Trigger point STM throughout wrist extensors/flexion  Trigger point IASTM throughout wrist extensors, gentle brushing over radial tunnel, strumming to trigger points throughout wrist extensor wad IASTM throughout wrist extensors, gentle brushing over radial tunnel, strumming to trigger points throughout wrist extensor wad IASTM throughout wrist extensors, gentle brushing over radial tunnel, strumming to trigger points throughout wrist extensor wad IASTM throughout wrist extensors, gentle brushing over radial tunnel, strumming to trigger points throughout wrist extensor wad   Self stretch/ROM Self stretch/ROM ROM to wrist and elbow Wrist circles    ROM to wrist and elbow ROM to wrist and forearm in supine, shoulder ROM ROM to wrist and forearm in supine, shoulder ROM   yellow Flex bar  -wrist flexion  -wrist extension  -bar bend  -reverse bar bend  -supination (sim door open)  -pronation (sim door close)  Each x 20 Green theraband  -bicep curl  -triceps extension  -pronation  -supination  -wrist flexion  -wrist extension  -horizontal abd  -shoulder flexion  -scapular retraction  Each x 20 Kinesiotape  Fascia correction, inhibition to wrist extensors beige Flex bar  Eccentric  -wrist flexion  -wrist extension  -bar bend  -reverse bar bend  -supination  -pronation  Each x 20 Wrist roller with 1 lb weight 2x5 Wall weight bands  Medium  High row  Low pronated row  Bicep curl  Triceps extension  Shoulder extension  Each x 20   Wall weights  High row 20 lb  Low row (pronated) 30 lb  Bicep curl 15 lb  Triceps extension 20 lb  Each x 20 2 lb weighted ball  -wrist alphabet  -catch x 30  -quick catch x 30  -wrist circles Objective measurements Kinesiotape  Fascia correction, inhibition to wrist extensors  Fascial correction at medial epicondyle and wrist flexor wad TRX  Scapular stabilization, overhead stretch x 10  Pull up position x 10  Push up position x 10 6 lb weighted ball  Scapular stabilization tasks        Gripper black level 4 x 25  Level 3 fingertip  x 20 Therapy paw green full  x 30  /pull x 20 Ergonomic education   combined with elbow flexion/extension with yellow flex bar x 20    Full  on blue power web x 20    Supination x 20  Pronation x 20 Parallel bar push ups   Scapular stabilization on wall and over foam roller  Pectoralis stretch     Kinesiotape to wrist extensor wad   HEP: HEP upgrades in bold    Charges:1 MT, 2 TE Total Timed Treatment: 40 min  Total Treatment Time: 40 min

## 2023-06-26 ENCOUNTER — OFFICE VISIT (OUTPATIENT)
Dept: OCCUPATIONAL MEDICINE | Age: 73
End: 2023-06-26
Attending: ORTHOPAEDIC SURGERY
Payer: MEDICARE

## 2023-06-26 PROCEDURE — 97110 THERAPEUTIC EXERCISES: CPT

## 2023-06-26 NOTE — PROGRESS NOTES
Diagnosis:    Radial tunnel syndrome, right (G56.31)  Medial epicondylitis of right elbow (M77.01)    Referring Provider: Eyad Wiley  Date of Evaluation:    5/5/23    Precautions:  None Next MD visit:   none scheduled  Date of Surgery: n/a   Insurance Primary/Secondary: Gurpreet Lopez / Henrik Rodriguez PPO     # Auth Visits: NA            Progress Summary  Pt has attended 11 visits in Occupational Therapy. Subjective:\"It's been feeling pretty good. \"  Pain: 0/10 at time of treatment      Objective: Discomfort noted at medial epicondyle with wall push up        Assessment: PREs well tolerated with little increase in pain overall and pt has increased overall use of arm at home during ADL and weighted activities. When present, pain primarily localized to medial epicondyle and into the wrist flexors however has not been present over the weekend. Discomfort present with WB tasks at times    Goals: (to be met in 8 visits)  Pt will be independent and compliant with comprehensive HEP to maintain progress achieved in OT  Pt will report no pain while lifting a moderately sized grocery bag: progressing  Pt will increase tripod pinch to 16 lb for use while opening containers: met 5/23/23  Pt will return to lifting weights at the gym (upper body): partially met, not full strength    Plan: Continue OT 1x/wk x 4 weeks for continued pain management, strengthening. Measurements. Patient/Family/Caregiver was advised of these findings, precautions, and treatment options and has agreed to actively participate in planning and for this course of care. Thank you for your referral. If you have any questions, please contact me at Dept: [phone]. Sincerely,  Electronically signed by therapist: SAMREEN Penn/L    Physician's certification required:  No  Please co-sign or sign and return this letter via fax as soon as possible to [phone].    I certify the need for these services furnished under this plan of treatment and while under my care.     X___________________________________________________ Date____________________    Certification From: 1/0/9692  To:8/31/2023   Date: 5/26/23  Tx#: 6/8 5/30/23  Tx: 7/8 6/1/23  Tx: 8/8 6/6/23  Tx: 9 6/15/23  Tx: 10 6/20/23  Tx: 11 6/26/23  Tx: 12   STM throughout wrist extensors/flexion  Trigger point STM throughout wrist extensors/flexion  Trigger point IASTM throughout wrist extensors, gentle brushing over radial tunnel, strumming to trigger points throughout wrist extensor wad IASTM throughout wrist extensors, gentle brushing over radial tunnel, strumming to trigger points throughout wrist extensor wad IASTM throughout wrist extensors, gentle brushing over radial tunnel, strumming to trigger points throughout wrist extensor wad IASTM throughout wrist extensors, gentle brushing over radial tunnel, strumming to trigger points throughout wrist extensor wad Wall weight bands  Medium  High rows  Tricep extension  3 x 10     Self stretch/ROM Self stretch/ROM ROM to wrist and elbow Wrist circles    ROM to wrist and elbow ROM to wrist and forearm in supine, shoulder ROM ROM to wrist and forearm in supine, shoulder ROM Wrist roller 2 lb x 5   yellow Flex bar  -wrist flexion  -wrist extension  -bar bend  -reverse bar bend  -supination (sim door open)  -pronation (sim door close)  Each x 20 Green theraband  -bicep curl  -triceps extension  -pronation  -supination  -wrist flexion  -wrist extension  -horizontal abd  -shoulder flexion  -scapular retraction  Each x 20 Kinesiotape  Fascia correction, inhibition to wrist extensors beige Flex bar  Eccentric  -wrist flexion  -wrist extension  -bar bend  -reverse bar bend  -supination  -pronation  Each x 20 Wrist roller with 1 lb weight 2x5 Wall weight bands  Medium  High row  Low pronated row  Bicep curl  Triceps extension  Shoulder extension  Each x 20 9 lb weighted ball slams x 20   Wall weights  High row 20 lb  Low row (pronated) 30 lb  Bicep curl 15 lb  Triceps extension 20 lb  Each x 20 2 lb weighted ball  -wrist alphabet  -catch x 30  -quick catch x 30  -wrist circles Objective measurements Kinesiotape  Fascia correction, inhibition to wrist extensors  Fascial correction at medial epicondyle and wrist flexor wad TRX  Scapular stabilization, overhead stretch x 10  Pull up position x 10  Push up position x 10 6 lb weighted ball  Scapular stabilization tasks      Scooter pulls with 32. 5 lb x 6    Crate carries 32.5 lb x 6   Gripper black level 4 x 25  Level 3 fingertip  x 20 Therapy paw green full  x 30  /pull x 20 Ergonomic education   combined with elbow flexion/extension with yellow flex bar x 20    Full  on blue power web x 20    Supination x 20  Pronation x 20 Parallel bar push ups Wall push ups x 20   Scapular stabilization on wall and over foam roller  Pectoralis stretch     Kinesiotape to wrist extensor wad Self stretches to elbow, forearm, shoulder, wrist   HEP: HEP upgrades in bold    Charges: 3 TE Total Timed Treatment: 40 min  Total Treatment Time: 40 min

## 2023-06-28 ENCOUNTER — ANESTHESIA (OUTPATIENT)
Dept: ENDOSCOPY | Facility: HOSPITAL | Age: 73
End: 2023-06-28
Payer: MEDICARE

## 2023-06-28 ENCOUNTER — ANESTHESIA EVENT (OUTPATIENT)
Dept: ENDOSCOPY | Facility: HOSPITAL | Age: 73
End: 2023-06-28
Payer: MEDICARE

## 2023-06-28 ENCOUNTER — HOSPITAL ENCOUNTER (OUTPATIENT)
Facility: HOSPITAL | Age: 73
Setting detail: HOSPITAL OUTPATIENT SURGERY
Discharge: HOME | End: 2023-06-28
Attending: INTERNAL MEDICINE | Admitting: INTERNAL MEDICINE
Payer: MEDICARE

## 2023-06-28 VITALS
TEMPERATURE: 98 F | WEIGHT: 183 LBS | BODY MASS INDEX: 27.11 KG/M2 | SYSTOLIC BLOOD PRESSURE: 113 MMHG | HEIGHT: 69 IN | OXYGEN SATURATION: 99 % | HEART RATE: 63 BPM | RESPIRATION RATE: 18 BRPM | DIASTOLIC BLOOD PRESSURE: 66 MMHG

## 2023-06-28 DIAGNOSIS — Z12.11 COLON CANCER SCREENING: ICD-10-CM

## 2023-06-28 PROCEDURE — 88305 TISSUE EXAM BY PATHOLOGIST: CPT | Performed by: INTERNAL MEDICINE

## 2023-06-28 PROCEDURE — 0DBK8ZX EXCISION OF ASCENDING COLON, VIA NATURAL OR ARTIFICIAL OPENING ENDOSCOPIC, DIAGNOSTIC: ICD-10-PCS | Performed by: INTERNAL MEDICINE

## 2023-06-28 RX ORDER — METOPROLOL TARTRATE 5 MG/5ML
INJECTION INTRAVENOUS AS NEEDED
Status: DISCONTINUED | OUTPATIENT
Start: 2023-06-28 | End: 2023-06-28 | Stop reason: SURG

## 2023-06-28 RX ORDER — SODIUM CHLORIDE, SODIUM LACTATE, POTASSIUM CHLORIDE, CALCIUM CHLORIDE 600; 310; 30; 20 MG/100ML; MG/100ML; MG/100ML; MG/100ML
INJECTION, SOLUTION INTRAVENOUS CONTINUOUS
Status: DISCONTINUED | OUTPATIENT
Start: 2023-06-28 | End: 2023-06-28

## 2023-06-28 RX ORDER — ESMOLOL HYDROCHLORIDE 10 MG/ML
INJECTION INTRAVENOUS AS NEEDED
Status: DISCONTINUED | OUTPATIENT
Start: 2023-06-28 | End: 2023-06-28 | Stop reason: SURG

## 2023-06-28 RX ORDER — LIDOCAINE HYDROCHLORIDE 10 MG/ML
INJECTION, SOLUTION EPIDURAL; INFILTRATION; INTRACAUDAL; PERINEURAL AS NEEDED
Status: DISCONTINUED | OUTPATIENT
Start: 2023-06-28 | End: 2023-06-28 | Stop reason: SURG

## 2023-06-28 RX ORDER — NALOXONE HYDROCHLORIDE 0.4 MG/ML
80 INJECTION, SOLUTION INTRAMUSCULAR; INTRAVENOUS; SUBCUTANEOUS AS NEEDED
Status: DISCONTINUED | OUTPATIENT
Start: 2023-06-28 | End: 2023-06-28

## 2023-06-28 RX ADMIN — METOPROLOL TARTRATE 2 MG: 5 INJECTION INTRAVENOUS at 12:45:00

## 2023-06-28 RX ADMIN — METOPROLOL TARTRATE 1 MG: 5 INJECTION INTRAVENOUS at 12:50:00

## 2023-06-28 RX ADMIN — ESMOLOL HYDROCHLORIDE 10 MG: 10 INJECTION INTRAVENOUS at 12:17:00

## 2023-06-28 RX ADMIN — SODIUM CHLORIDE, SODIUM LACTATE, POTASSIUM CHLORIDE, CALCIUM CHLORIDE: 600; 310; 30; 20 INJECTION, SOLUTION INTRAVENOUS at 12:17:00

## 2023-06-28 RX ADMIN — LIDOCAINE HYDROCHLORIDE 5 ML: 10 INJECTION, SOLUTION EPIDURAL; INFILTRATION; INTRACAUDAL; PERINEURAL at 12:22:00

## 2023-06-28 RX ADMIN — ESMOLOL HYDROCHLORIDE 10 MG: 10 INJECTION INTRAVENOUS at 12:22:00

## 2023-07-11 ENCOUNTER — OFFICE VISIT (OUTPATIENT)
Dept: OCCUPATIONAL MEDICINE | Age: 73
End: 2023-07-11
Attending: ORTHOPAEDIC SURGERY
Payer: MEDICARE

## 2023-07-11 PROCEDURE — 97110 THERAPEUTIC EXERCISES: CPT

## 2023-07-11 NOTE — PROGRESS NOTES
Diagnosis:    Radial tunnel syndrome, right (G56.31)  Medial epicondylitis of right elbow (M77.01)    Referring Provider: Helena Garzon  Date of Evaluation:    5/5/23    Precautions:  None Next MD visit:   none scheduled  Date of Surgery: n/a   Insurance Primary/Secondary: MEDICARE / Francesco Magoet PPO     # Auth Visits: NA            Discharge Summary  Pt has attended 13 visits in Occupational Therapy. Subjective:\"It's been feeling good. No problems. I've been lifting more weights, but slowly. \"  Pain: 0/10 over the past week      Objective:  No pain during all exercises this visit        Assessment: Pt able to complete all exercises in clinic and pt has returned to all regular activities without pain. Pt has returned to regular weight lifting tasks at gym without pain. At this time, pt is independent with performance of HEP and will be d/c from OT. Goals: (to be met in 8 visits)  Pt will be independent and compliant with comprehensive HEP to maintain progress achieved in OT: met  Pt will report no pain while lifting a moderately sized grocery bag: met  Pt will increase tripod pinch to 16 lb for use while opening containers: met 5/23/23  Pt will return to lifting weights at the gym (upper body): met    Plan: Discharge pt to HEP only. Post QuickDASH Outcome Score  Post Score: 0 % (7/11/2023  7:01 AM)    9.09 % improvement    Patient/Family/Caregiver was advised of these findings, precautions, and treatment options and has agreed to actively participate in planning and for this course of care. Thank you for your referral. If you have any questions, please contact me at Dept: [phone]. Sincerely,  Electronically signed by therapist: Jorden Burkitt, OTR/L    Physician's certification required:  No  Please co-sign or sign and return this letter via fax as soon as possible to [phone].    I certify the need for these services furnished under this plan of treatment and while under my care.    X___________________________________________________ Date____________________    Certification From: 8/25/7328  To:10/9/2023     6/1/23  Tx: 8/8 6/6/23  Tx: 9 6/15/23  Tx: 10 6/20/23  Tx: 11 6/26/23  Tx: 12 7/11/23  Tx: 13   IASTM throughout wrist extensors, gentle brushing over radial tunnel, strumming to trigger points throughout wrist extensor wad IASTM throughout wrist extensors, gentle brushing over radial tunnel, strumming to trigger points throughout wrist extensor wad IASTM throughout wrist extensors, gentle brushing over radial tunnel, strumming to trigger points throughout wrist extensor wad IASTM throughout wrist extensors, gentle brushing over radial tunnel, strumming to trigger points throughout wrist extensor wad Wall weight bands  Medium  High rows  Tricep extension  3 x 10   Upper cycle 6 min level 4  Forward/reverse   ROM to wrist and elbow Wrist circles    ROM to wrist and elbow ROM to wrist and forearm in supine, shoulder ROM ROM to wrist and forearm in supine, shoulder ROM Wrist roller 2 lb x 5 8 lb dowel alon ex  Bicep curl  Overhead press           Kinesiotape  Fascia correction, inhibition to wrist extensors beige Flex bar  Eccentric  -wrist flexion  -wrist extension  -bar bend  -reverse bar bend  -supination  -pronation  Each x 20 Wrist roller with 1 lb weight 2x5 Wall weight bands  Medium  High row  Low pronated row  Bicep curl  Triceps extension  Shoulder extension  Each x 20 9 lb weighted ball slams x 20 7 lb static bicep curl, bilateral     Objective measurements Kinesiotape  Fascia correction, inhibition to wrist extensors  Fascial correction at medial epicondyle and wrist flexor wad TRX  Scapular stabilization, overhead stretch x 10  Pull up position x 10  Push up position x 10 6 lb weighted ball  Scapular stabilization tasks      Scooter pulls with 32. 5 lb x 6    Crate carries 32.5 lb x 6 3 lb weighted cuffs with use of pulleys    Beanbag tosses (weighted)    Wall washes overhead   Ergonomic education   combined with elbow flexion/extension with yellow flex bar x 20    Full  on blue power web x 20    Supination x 20  Pronation x 20 Parallel bar push ups Wall push ups x 20 [place] board at multiple levels to remove and replace bolts.        Kinesiotape to wrist extensor wad Self stretches to elbow, forearm, shoulder, wrist    HEP: HEP upgrades in bold    Charges: 3 TE Total Timed Treatment: 40 min  Total Treatment Time: 40 min

## 2023-08-19 ENCOUNTER — HOSPITAL ENCOUNTER (OUTPATIENT)
Dept: ULTRASOUND IMAGING | Age: 73
Discharge: HOME | End: 2023-08-19
Attending: NURSE PRACTITIONER
Payer: MEDICARE

## 2023-08-19 DIAGNOSIS — K82.4 GALLBLADDER POLYP: ICD-10-CM

## 2023-08-19 PROCEDURE — 76700 US EXAM ABDOM COMPLETE: CPT | Performed by: NURSE PRACTITIONER

## 2023-08-30 ENCOUNTER — OFFICE VISIT (OUTPATIENT)
Dept: FAMILY MEDICINE CLINIC | Facility: CLINIC | Age: 73
End: 2023-08-30
Payer: MEDICARE

## 2023-08-30 VITALS
HEART RATE: 60 BPM | SYSTOLIC BLOOD PRESSURE: 112 MMHG | OXYGEN SATURATION: 98 % | HEIGHT: 69 IN | BODY MASS INDEX: 27.25 KG/M2 | RESPIRATION RATE: 16 BRPM | TEMPERATURE: 98 F | DIASTOLIC BLOOD PRESSURE: 70 MMHG | WEIGHT: 184 LBS

## 2023-08-30 DIAGNOSIS — I72.3 ILIAC ARTERY ANEURYSM, BILATERAL (HCC): ICD-10-CM

## 2023-08-30 DIAGNOSIS — D69.6 THROMBOCYTOPENIA (HCC): ICD-10-CM

## 2023-08-30 DIAGNOSIS — Z00.00 ENCOUNTER FOR ANNUAL HEALTH EXAMINATION: ICD-10-CM

## 2023-08-30 DIAGNOSIS — Z00.00 ENCOUNTER FOR ANNUAL WELLNESS EXAM IN MEDICARE PATIENT: Primary | ICD-10-CM

## 2023-08-30 DIAGNOSIS — I49.5 SSS (SICK SINUS SYNDROME) (HCC): ICD-10-CM

## 2023-08-30 DIAGNOSIS — I48.0 PAF (PAROXYSMAL ATRIAL FIBRILLATION) (HCC): ICD-10-CM

## 2023-08-30 PROBLEM — R36.1 HEMATOSPERMIA: Status: RESOLVED | Noted: 2019-01-09 | Resolved: 2023-08-30

## 2023-08-30 PROCEDURE — G0439 PPPS, SUBSEQ VISIT: HCPCS | Performed by: FAMILY MEDICINE

## 2023-08-30 PROCEDURE — 1125F AMNT PAIN NOTED PAIN PRSNT: CPT | Performed by: FAMILY MEDICINE

## 2024-06-01 ENCOUNTER — MED REC SCAN ONLY (OUTPATIENT)
Dept: FAMILY MEDICINE CLINIC | Facility: CLINIC | Age: 74
End: 2024-06-01

## 2024-07-20 ENCOUNTER — HOSPITAL ENCOUNTER (OUTPATIENT)
Dept: ULTRASOUND IMAGING | Age: 74
Discharge: HOME | End: 2024-07-20
Attending: NURSE PRACTITIONER
Payer: MEDICARE

## 2024-07-20 DIAGNOSIS — K82.4 GALLBLADDER POLYP: ICD-10-CM

## 2024-07-20 PROCEDURE — 76700 US EXAM ABDOM COMPLETE: CPT | Performed by: NURSE PRACTITIONER

## 2024-09-09 ENCOUNTER — OFFICE VISIT (OUTPATIENT)
Dept: FAMILY MEDICINE CLINIC | Facility: CLINIC | Age: 74
End: 2024-09-09
Payer: MEDICARE

## 2024-09-09 ENCOUNTER — LAB ENCOUNTER (OUTPATIENT)
Dept: LAB | Age: 74
End: 2024-09-09
Attending: FAMILY MEDICINE
Payer: MEDICARE

## 2024-09-09 VITALS
DIASTOLIC BLOOD PRESSURE: 74 MMHG | RESPIRATION RATE: 16 BRPM | BODY MASS INDEX: 26.07 KG/M2 | SYSTOLIC BLOOD PRESSURE: 118 MMHG | OXYGEN SATURATION: 98 % | HEART RATE: 59 BPM | HEIGHT: 69 IN | WEIGHT: 176 LBS

## 2024-09-09 DIAGNOSIS — I48.0 PAF (PAROXYSMAL ATRIAL FIBRILLATION) (HCC): ICD-10-CM

## 2024-09-09 DIAGNOSIS — Z12.5 SCREENING FOR MALIGNANT NEOPLASM OF PROSTATE: ICD-10-CM

## 2024-09-09 DIAGNOSIS — Z00.00 ENCOUNTER FOR ANNUAL HEALTH EXAMINATION: ICD-10-CM

## 2024-09-09 DIAGNOSIS — B18.2 CHRONIC HEPATITIS C WITHOUT HEPATIC COMA (HCC): ICD-10-CM

## 2024-09-09 DIAGNOSIS — I10 PRIMARY HYPERTENSION: ICD-10-CM

## 2024-09-09 DIAGNOSIS — D69.6 THROMBOCYTOPENIA (HCC): ICD-10-CM

## 2024-09-09 DIAGNOSIS — I49.5 SSS (SICK SINUS SYNDROME) (HCC): ICD-10-CM

## 2024-09-09 DIAGNOSIS — I72.3 ILIAC ARTERY ANEURYSM, BILATERAL (HCC): ICD-10-CM

## 2024-09-09 DIAGNOSIS — Z00.00 ENCOUNTER FOR ANNUAL WELLNESS EXAM IN MEDICARE PATIENT: Primary | ICD-10-CM

## 2024-09-09 LAB
ALBUMIN SERPL-MCNC: 4.2 G/DL (ref 3.2–4.8)
ALBUMIN/GLOB SERPL: 1.3 {RATIO} (ref 1–2)
ALP LIVER SERPL-CCNC: 57 U/L
ALT SERPL-CCNC: 22 U/L
ANION GAP SERPL CALC-SCNC: 5 MMOL/L (ref 0–18)
AST SERPL-CCNC: 32 U/L (ref ?–34)
BASOPHILS # BLD AUTO: 0.03 X10(3) UL (ref 0–0.2)
BASOPHILS NFR BLD AUTO: 0.6 %
BILIRUB SERPL-MCNC: 1.2 MG/DL (ref 0.2–1.1)
BILIRUB UR QL STRIP.AUTO: NEGATIVE
BUN BLD-MCNC: 12 MG/DL (ref 9–23)
CALCIUM BLD-MCNC: 9.6 MG/DL (ref 8.7–10.4)
CHLORIDE SERPL-SCNC: 106 MMOL/L (ref 98–112)
CHOLEST SERPL-MCNC: 139 MG/DL (ref ?–200)
CLARITY UR REFRACT.AUTO: CLEAR
CO2 SERPL-SCNC: 30 MMOL/L (ref 21–32)
COLOR UR AUTO: YELLOW
COMPLEXED PSA SERPL-MCNC: 1.16 NG/ML (ref ?–4)
CREAT BLD-MCNC: 0.76 MG/DL
EGFRCR SERPLBLD CKD-EPI 2021: 95 ML/MIN/1.73M2 (ref 60–?)
EOSINOPHIL # BLD AUTO: 0.1 X10(3) UL (ref 0–0.7)
EOSINOPHIL NFR BLD AUTO: 1.9 %
ERYTHROCYTE [DISTWIDTH] IN BLOOD BY AUTOMATED COUNT: 12.6 %
FASTING PATIENT LIPID ANSWER: YES
FASTING STATUS PATIENT QL REPORTED: YES
GLOBULIN PLAS-MCNC: 3.2 G/DL (ref 2–3.5)
GLUCOSE BLD-MCNC: 86 MG/DL (ref 70–99)
GLUCOSE UR STRIP.AUTO-MCNC: NORMAL MG/DL
HCT VFR BLD AUTO: 41.4 %
HDLC SERPL-MCNC: 52 MG/DL (ref 40–59)
HGB BLD-MCNC: 13.9 G/DL
IMM GRANULOCYTES # BLD AUTO: 0.01 X10(3) UL (ref 0–1)
IMM GRANULOCYTES NFR BLD: 0.2 %
KETONES UR STRIP.AUTO-MCNC: NEGATIVE MG/DL
LDLC SERPL CALC-MCNC: 75 MG/DL (ref ?–100)
LEUKOCYTE ESTERASE UR QL STRIP.AUTO: NEGATIVE
LYMPHOCYTES # BLD AUTO: 0.82 X10(3) UL (ref 1–4)
LYMPHOCYTES NFR BLD AUTO: 16 %
MCH RBC QN AUTO: 32.1 PG (ref 26–34)
MCHC RBC AUTO-ENTMCNC: 33.6 G/DL (ref 31–37)
MCV RBC AUTO: 95.6 FL
MONOCYTES # BLD AUTO: 0.44 X10(3) UL (ref 0.1–1)
MONOCYTES NFR BLD AUTO: 8.6 %
NEUTROPHILS # BLD AUTO: 3.73 X10 (3) UL (ref 1.5–7.7)
NEUTROPHILS # BLD AUTO: 3.73 X10(3) UL (ref 1.5–7.7)
NEUTROPHILS NFR BLD AUTO: 72.7 %
NITRITE UR QL STRIP.AUTO: NEGATIVE
NONHDLC SERPL-MCNC: 87 MG/DL (ref ?–130)
OSMOLALITY SERPL CALC.SUM OF ELEC: 291 MOSM/KG (ref 275–295)
PH UR STRIP.AUTO: 5.5 [PH] (ref 5–8)
PLATELET # BLD AUTO: 168 10(3)UL (ref 150–450)
POTASSIUM SERPL-SCNC: 4.5 MMOL/L (ref 3.5–5.1)
PROT SERPL-MCNC: 7.4 G/DL (ref 5.7–8.2)
PROT UR STRIP.AUTO-MCNC: NEGATIVE MG/DL
RBC # BLD AUTO: 4.33 X10(6)UL
RBC UR QL AUTO: NEGATIVE
SODIUM SERPL-SCNC: 141 MMOL/L (ref 136–145)
SP GR UR STRIP.AUTO: 1.02 (ref 1–1.03)
T4 FREE SERPL-MCNC: 0.8 NG/DL (ref 0.8–1.7)
TRIGL SERPL-MCNC: 56 MG/DL (ref 30–149)
TSI SER-ACNC: 2.05 MIU/ML (ref 0.55–4.78)
UROBILINOGEN UR STRIP.AUTO-MCNC: NORMAL MG/DL
VLDLC SERPL CALC-MCNC: 9 MG/DL (ref 0–30)
WBC # BLD AUTO: 5.1 X10(3) UL (ref 4–11)

## 2024-09-09 PROCEDURE — 85025 COMPLETE CBC W/AUTO DIFF WBC: CPT

## 2024-09-09 PROCEDURE — 81003 URINALYSIS AUTO W/O SCOPE: CPT

## 2024-09-09 PROCEDURE — 84439 ASSAY OF FREE THYROXINE: CPT

## 2024-09-09 PROCEDURE — 36415 COLL VENOUS BLD VENIPUNCTURE: CPT

## 2024-09-09 PROCEDURE — 87522 HEPATITIS C REVRS TRNSCRPJ: CPT

## 2024-09-09 PROCEDURE — 84443 ASSAY THYROID STIM HORMONE: CPT

## 2024-09-09 PROCEDURE — 86803 HEPATITIS C AB TEST: CPT

## 2024-09-09 PROCEDURE — 80061 LIPID PANEL: CPT

## 2024-09-09 PROCEDURE — 80053 COMPREHEN METABOLIC PANEL: CPT

## 2024-09-09 NOTE — PATIENT INSTRUCTIONS
Thank you for choosing Landen Alexander MD at [facility]  To Do: Ulices Talavera  1. Please see age appropriate health prevention below     Call [phone] to schedule the appointment.   Please signup for [employer], which is electronic access to your record if you have not done so.  All your results will post on there.  [email or web address]   You can NOW use Appfolio to book your appointments with us, or consider using open access scheduling which is through the [employer] website [email or web address] and type in Landen Alexander MD and follow the links for \"Schedule Online Now\"    To schedule Imaging or tests at [place] call Central Scheduling [phone], Go to [facility] ER Building (For example: CT scans, X rays, Ultrasound, MRI)  Cardiac Testing in ER building Building A second floor Cardiac Testing [phone] (For example: Holter Monitor, Cardiac Stress tests,Event Monitor, or 2D Echocardiograms)  [facility] call [phone] usually in [facility]  Walk in Clinic in [place] at [address] Mon-Fri at 8am-7:30 p.m., and Sat/Sun 9:00a.m.-4:30 p.m.  Also at [address] [place] [address]  Call [phone] for info     Please call our office about any questions regarding your treatment/medicines/tests as a result of today's visit.  For your safety, read the entire package insert of all medicines prescribed to you and be aware of all of the risks of treatment even beyond those discussed today.  All therapies have potential risk of harm or side effects or medication interactions.  It is your duty and for your safety to discuss with the pharmacist and our office with questions, and to notify us and stop treatment if problems arise, but know that our intention is that the benefits outweigh those potential risks and we strive to make you healthier and to improve your quality of life.    Referrals, and Radiology Information:    If your insurance requires a referral to a specialist, please  allow 5 business days to process your referral request.    If Landen Alexander MD orders a CT or MRI, it may take up to 10 business days to receive approval from your insurance company. Once our office has called informing you that the insurance company approved your testing, please call Central Scheduling at [phone]  Please allow our office 5 business days to contact you regarding any testing results.    Refill policies:   Allow 3 business days for refills; controlled substances may take longer and must be picked up from the office in person.  Narcotic medications can only be filled in 30 day increments and must be refilled at an office visit only.  If your prescription is due for a refill, you may be due for a follow-up appointment.  We cannot refill your maintenance medications at a preventative wellness visit.  To best provide you care, patients receiving maintenance medications need to be seen at least twice a year.

## 2024-09-09 NOTE — PROGRESS NOTES
Subjective:   Ulices Talavera is a 74 year old male who presents for a Medicare Subsequent Annual Wellness visit (Pt already had Initial Annual Wellness) and scheduled follow up of multiple significant but stable problems.   Mr. Talavera is a pleasant 74-year-old gentleman with history of hypertension, hyperlipidemia, A-fib/a flutter on Eliquis for anticoagulation status post, mitral valve replacement status post pacemaker placement, history of iliac Aneurysm, chronic hepatitis C infection here for his Medicare wellness exam.  He is doing generally well except that over the past few episodes he had noticed brownish tinge in his ejaculate.  No fever no cough no chest pain no shortness of breath no nausea no vomiting no abdominal pain no blood in the urine or stool.  He exercises regularly.  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  He still loves doing this.  He had a colonoscopy in 2023 and was recommended by his gastroenterologist to do his hepatitis C level.  This was previously checked by his gastroenterologist. I had reviewed past medical and family histories together with allergy and medication lists documented.      History/Other:   Fall Risk Assessment:   He has been screened for Falls and is low risk.      Cognitive Assessment:   He had a completely normal cognitive assessment - see flowsheet entries     Functional Ability/Status:   Ulices Talavera has a completely normal functional assessment. See flowsheet for details.      Depression Screening (PHQ):  PHQ-2 SCORE: 0  , done 9/2/2024            Advanced Directives:   He does have a Living Will but we do NOT have it on file in Epic.    He does have a POA but we do NOT have it on file in Epic.    Not discussed      Patient Active Problem List   Diagnosis    Viral hepatitis C    Vitamin B12 deficiency    Vitamin D deficiency    Hyperlipemia    Hypertension    Anemia    Thrombocytopenia (HCC)    S/P MVR (mitral valve replacement)    Pacemaker    Iliac artery  aneurysm, bilateral (HCC)    Renal cyst    Hip arthritis    PAF (paroxysmal atrial fibrillation) (HCC)    AV block, Mobitz 2    LAE (left atrial enlargement)    Nonrheumatic aortic valve insufficiency    SSS (sick sinus syndrome) (HCC)     Allergies:  He has No Known Allergies.    Current Medications:  Outpatient Medications Marked as Taking for the 9/9/24 encounter (Office Visit) with Landen Alexander MD   Medication Sig    Ergocalciferol (VITAMIN D OR) Take by mouth.    ELIQUIS 5 MG Oral Tab Take 1 tablet (5 mg total) by mouth 2 (two) times daily.    ketoconazole 2 % External Cream Apply to affected area as needed    Atorvastatin Calcium (LIPITOR) 20 MG Oral Tab Take 1 tablet (20 mg total) by mouth nightly.    Multiple Vitamin (MULTIVITAMIN OR) Take 1 tablet by mouth daily.         Medical History:  He  has a past medical history of A-fib (HCC) (07/28/2015), Acute hepatitis C without mention of hepatic coma(070.51) (01/2006), Arrhythmia, Back pain (Occasionally), Belching (After meals occassionallly), Bladder wall thickening (05/09/2016), Bloating, Cataract, Easy bruising (On Blood Thinner), Flatulence/gas pain/belching, Food intolerance (August, 20022 stopped drinking milk), Heart palpitations (7/2015), Heart valve disease, Hip arthritis (05/09/2016), History of blood transfusion, History of cardiac murmur, Iliac artery aneurysm, bilateral (HCC) (05/09/2016), Mitral valve disorders(424.0), Mitral valve insufficiency (01/09/2014), Osteoarthritis, Pacemaker (07/28/2015), PUD (peptic ulcer disease) (11/1966), Renal cyst (05/09/2016), S/P mitral valve repair (08/04/2014), S/P MVR (mitral valve replacement), Severe mitral regurgitation (01/08/2014), Sprain of lumbar region, Unspecified essential hypertension, Visual impairment, and Wears glasses.  Surgical History:  He  has a past surgical history that includes appendectomy (2005); other surgical history (07/14/2005); gastrotomy,esoph dil+perm tube; colonoscopy  (2013); replacement of mitral valve (06/2015); angiogram (06/2015); valve repair; endovas repair, infrarenl abdom aortic aneurysm/dissect (2 - Abdominal Aortic Aneurysm); appendectomy (11-); Cardiac pacemaker placement (6-9-2015); and colonoscopy (N/A, 6/28/2023).   Family History:  His family history includes mitral valve repair in his mother.  Social History:  He  reports that he has never smoked. He has never used smokeless tobacco. He reports that he does not drink alcohol and does not use drugs.    Tobacco:  He has never smoked tobacco.    CAGE Alcohol Screen:   CAGE screening score of 0 on 9/2/2024, showing low risk of alcohol abuse.      Patient Care Team:  Landen Alexander MD as PCP - General  Ravindra Lee MD as Consulting Physician (Cardiovascular Diseases)  Rosemary Zarate MD as Consulting Physician (GASTROENTEROLOGY)  Rodrigo Perez MD (ONCOLOGY)  Glen Ly MD (DERMATOLOGY)  Zenon Montoya MD (UROLOGY)  Omid Patel PT as Physical Therapist  Larry Torrse MD (SURGERY, ORTHOPEDIC)  Wu Blount (CARDIOLOGY)  Digna Russell OT as Occupational Therapist (Occupational Therapist)  Polina Noyola APRN (Nurse Practitioner)    Review of Systems  Constitutional:  Negative for fatigue and fever.   HENT:  Negative for sore throat and trouble swallowing.    Eyes:  Negative for visual disturbance.   Respiratory:  Negative for cough and shortness of breath.    Cardiovascular:  Negative for chest pain, palpitations and leg swelling.   Gastrointestinal:  Negative for abdominal pain, diarrhea, nausea and vomiting.   Genitourinary:  Negative for difficulty urinating, dysuria and hematuria.   Musculoskeletal:  Negative for arthralgias.   Neurological:  Negative for dizziness, weakness, light-headedness and headaches.           Objective:   Physical Exam  Vitals reviewed.   Constitutional:       General: He is not in acute distress.  HENT:      Right Ear: Tympanic membrane, ear canal and  external ear normal. There is no impacted cerumen.      Left Ear: Tympanic membrane, ear canal and external ear normal. There is no impacted cerumen.      Mouth/Throat:      Mouth: Mucous membranes are moist.      Pharynx: Oropharynx is clear. No oropharyngeal exudate.   Eyes:      Conjunctiva/sclera: Conjunctivae normal.   Neck:      Thyroid: No thyroid mass.   Cardiovascular:      Rate and Rhythm: Normal rate and irregular rhythm.      Heart sounds: Normal heart sounds. No murmur heard.  Pulmonary:      Effort: Pulmonary effort is normal. No respiratory distress.      Breath sounds: Normal breath sounds. No wheezing or rales.   Abdominal:      General: Bowel sounds are normal.      Palpations: Abdomen is soft.   Musculoskeletal:        Right lower leg: No edema.      Left lower leg: No edema.   Lymphadenopathy:      Cervical: No cervical adenopathy.      Right cervical: No superficial, deep or posterior cervical adenopathy.     Left cervical: No superficial, deep or posterior cervical adenopathy.   Neurological:      Mental Status: He is alert.   /74   Pulse 59   Resp 16   Ht 5' 9\" (1.753 m)   Wt 176 lb (79.8 kg)   SpO2 98%   BMI 25.99 kg/m²  Estimated body mass index is 25.99 kg/m² as calculated from the following:    Height as of this encounter: 5' 9\" (1.753 m).    Weight as of this encounter: 176 lb (79.8 kg).    Medicare Hearing Assessment:   Hearing Screening    Screening Method: Whisper Test  Whisper Test Result: Pass               Assessment & Plan:   Ulices Talavera is a 74 year old male who presents for a Medicare Assessment.     1. Encounter for annual wellness exam in Medicare patient (Primary)  2. Thrombocytopenia ([facility])  Overview:  chronic stable issue, continue present management with observation and medications as noted    3. SSS (sick sinus syndrome) ([facility])  Overview:  chronic stable issue, continue present management with observation and medications as noted   S/P Pacemaker   4. PAF  (paroxysmal atrial fibrillation) (HCC)  Overview:  Formatting of this note might be different from the original.  Occurred on 6/26/2017 for 11 hours    chronic stable issue, continue present management with observation and medications as noted  Orders:  -     CBC With Differential With Platelet; Future; Expected date: 09/09/2024  -     Comp Metabolic Panel (14); Future; Expected date: 09/09/2024  -     TSH and Free T4; Future; Expected date: 09/09/2024  5. Iliac artery aneurysm, bilateral (HCC)  Overview:  chronic stable issue, continue present management with observation and medications as noted    6. Screening for malignant neoplasm of prostate  -     PSA Total, Screen; Future; Expected date: 09/09/2024  7. Encounter for annual health examination  8. Primary hypertension  Overview:  chronic stable issue, continue present management with observation and medications as noted  Orders:  -     Lipid Panel; Future; Expected date: 09/09/2024  -     Urinalysis, Routine; Future; Expected date: 09/09/2024  9. Chronic hepatitis C without hepatic coma (HCC)  Overview:  In remission 2016- VL 0 for many years    Liver biopsy on 12/27/2012 showed chronic hepatitis with grade 1 inflammation and stage 1 fibrosis.  Started on triple therapy (Pegasys, Ribavirin, Boceprevir) in 1/2013.    In remission 2015- VL 0    chronic stable issue, continue present management with observation and medications as noted    Orders:  -     HCV Antibody; Future; Expected date: 09/09/2024      With regards to brownish tinge in his semen etiology is unclear but will check urinalysis and await results and will provide recommendations thereafter.  The patient indicates understanding of these issues and agrees to the plan.  Continue with current treatment plan.  Follow up in  1  year(s).  Lab work ordered.  Patient reassured.  Reinforced healthy diet, lifestyle, and exercise.        This note was prepared using Dragon Medical voice recognition dictation  software. As a result errors may occur. When identified these errors have been corrected. While every attempt is made to correct errors during dictation discrepancies may still exist            Return in about 1 year (around 9/9/2025), or if symptoms worsen or fail to improve, for wellness.     Landen Alexander MD, 9/9/2024     Supplementary Documentation:   General Health:  In the past six months, have you lost more than 10 pounds without trying?: 2 - No  Has your appetite been poor?: No  Type of Diet: Balanced  How does the patient maintain a good energy level?: Appropriate Exercise  How would you describe your daily physical activity?: Moderate  How would you describe your current health state?: Good  How do you maintain positive mental well-being?: Social Interaction;Visiting Friends;Visiting Family  On a scale of 0 to 10, with 0 being no pain and 10 being severe pain, what is your pain level?: 0 - (None)  In the past six months, have you experienced urine leakage?: 0-No  At any time do you feel concerned for the safety/well-being of yourself and/or your children, in your home or elsewhere?: No  Have you had any immunizations at another office such as Influenza, Hepatitis B, Tetanus, or Pneumococcal?: No    Health Maintenance   Topic Date Due    Zoster Vaccines (1 of 2) Never done    Pneumococcal Vaccine: 65+ Years (2 of 2 - PPSV23 or PCV20) 06/15/2016    Annual Depression Screening  01/01/2024    Annual Physical  08/30/2024    COVID-19 Vaccine (4 - 2023-24 season) 09/01/2024    Influenza Vaccine (1) 10/01/2024    PSA  05/23/2025    Colorectal Cancer Screening  06/28/2028    Fall Risk Screening (Annual)  Completed

## 2024-09-11 NOTE — PROGRESS NOTES
Labs with no concerning values.  Hepatitis C is not detected. please notify patient.    -Dr. Alexander

## 2024-12-09 ENCOUNTER — TELEPHONE (OUTPATIENT)
Dept: FAMILY MEDICINE CLINIC | Facility: CLINIC | Age: 74
End: 2024-12-09

## 2024-12-09 NOTE — TELEPHONE ENCOUNTER
Patient calling to request that MD place order for US abdomen abdominal ultrasound and labs with Hep B.     Please advise via [email or web address]

## 2025-06-02 ENCOUNTER — TELEPHONE (OUTPATIENT)
Dept: FAMILY MEDICINE CLINIC | Facility: CLINIC | Age: 75
End: 2025-06-02

## 2025-06-02 DIAGNOSIS — B18.2 CHRONIC HEPATITIS C WITHOUT HEPATIC COMA (HCC): Primary | ICD-10-CM

## 2025-06-02 DIAGNOSIS — Z12.5 SCREENING FOR MALIGNANT NEOPLASM OF PROSTATE: ICD-10-CM

## 2025-06-02 DIAGNOSIS — Z00.00 ENCOUNTER FOR ANNUAL WELLNESS EXAM IN MEDICARE PATIENT: ICD-10-CM

## 2025-06-02 NOTE — TELEPHONE ENCOUNTER
Future Appointments   Date Time Provider Department Center   7/19/2025  8:30 AM PF [address]   9/10/2025  7:00 AM Landen Alexander MD [facility] [address] [facility] [address]     Patient requesting lab orders for AWV, patient also requesting orders for Hep C testing.

## 2025-06-02 NOTE — TELEPHONE ENCOUNTER
Patient has annual in September.  Requesting annual labs and Hep C testing.  Hep C RNA qnt with reflex to genotype last done on 9/10/2024.  Okay to order again with other labs?

## 2025-07-19 ENCOUNTER — HOSPITAL ENCOUNTER (OUTPATIENT)
Dept: ULTRASOUND IMAGING | Age: 75
Discharge: HOME | End: 2025-07-19
Attending: NURSE PRACTITIONER
Payer: MEDICARE

## 2025-07-19 DIAGNOSIS — K82.4 GALLBLADDER POLYP: ICD-10-CM

## 2025-07-19 PROCEDURE — 76700 US EXAM ABDOM COMPLETE: CPT | Performed by: NURSE PRACTITIONER

## (undated) DEVICE — KIT ENDO ORCAPOD 160/180/190

## (undated) DEVICE — 1200CC GUARDIAN II: Brand: GUARDIAN

## (undated) DEVICE — BIOGUARD CLEANING ADAPTER

## (undated) DEVICE — 10FT COMBINED O2 DELIVERY/CO2 MONITORING. FILTER WITH MICROSTREAM TYPE LUER: Brand: DUAL ADULT NASAL CANNULA

## (undated) DEVICE — 3M™ RED DOT™ MONITORING ELECTRODE WITH FOAM TAPE AND STICKY GEL, 50/BAG, 20/CASE, 72/PLT 2570: Brand: RED DOT™

## (undated) DEVICE — ENDOSCOPY PACK - LOWER: Brand: MEDLINE INDUSTRIES, INC.

## (undated) NOTE — MR AVS SNAPSHOT
[facility] [address]  [address] [facility]  [phone]               Thank you for choosing us for your health care visit with Joyce Longo MD.  We are glad to serve you and happy to provide you with t physician's office. At that time, you will be provided with any authorization numbers or be assured that none are required. You can then schedule your appointment.  Failure to obtain required authorization numbers can create reimbursement difficulties for y Shallots   Taro  Fruit – fruits can contain high fructose  Apples   Apricots   Avocado   Blackberries   Boysenberry   Cherries   Currants   Custard apple   Dates   Feijoa   Figs   Goji berries   Grapefruit   Guava, unripe   Lychee   [place]   Nectarines   Pe Beer – if drinking more than one bottle   Coconut water   Cordial, apple and raspberry with % real juice   Cordial, orange with 25-50% real juice   Fruit and herbal teas with apple added   Fruit juices in large quantities   Fruit juices made of apple Call [phone] for info    • Please call our office about any questions regarding your treatment/medicines/tests as a result of today's visit.  For your safety, read the entire package insert of all medicines prescribed to you and be aware of all of the 118/76 mmHg 60 97.7 °F (36.5 °C) (Temporal) 68\" 178 lb 27.07 kg/m2         Current Medications          This list is accurate as of: 4/24/17  4:53 PM.  Always use your most recent med list.                aspirin 81 MG Tabs   Take 81 mg by mouth daily. Start activities slowly and build up over time Do what you like   Get your heart pumping – brisk walking, biking, swimming Even 10 minute increments are effective and add up over the week   2 ½ hours per week – spread out over time Use a karissa to keep you

## (undated) NOTE — LETTER
Patient Name: Jane Dutta  YOB: 1950          MRN number:  [id number]  Date:  8/30/2017  Referring Physician:  Natalie Peterson          SPINE EVALUATION:    Referring Physician: Ms. Marylen Boards  Diagnosis: Cervicogenic Headaches     Date of Serv cervical motion. Limited upper cervical flexion. Accessory motion: Decreased posterior glide of the OA joint. Palpation: Increased tone of the posterior subcranial spine. Strength:  Moderate restrictions of the middle and lower trapezius (3+/ treatment and while under my care.     X___________________________________________________ Date____________________    Certification From: 2/64/8607  To:11/28/2017

## (undated) NOTE — ED AVS SNAPSHOT
Edwin Trotter   MRN: [id number]    Department:  THE [facility] Emergency Department in [place]   Date of Visit:  2/9/2019           Disclosure     Insurance plans vary and the physician(s) referred by the ER may not be covered by your plan.  Please contact tell this physician (or your personal doctor if your instructions are to return to your personal doctor) about any new or lasting problems. The primary care or specialist physician will see patients referred from the [facility] Emergency Department.  Shane Luna